# Patient Record
Sex: MALE | Race: WHITE | Employment: PART TIME | URBAN - METROPOLITAN AREA
[De-identification: names, ages, dates, MRNs, and addresses within clinical notes are randomized per-mention and may not be internally consistent; named-entity substitution may affect disease eponyms.]

---

## 2019-06-20 ENCOUNTER — OFFICE VISIT (OUTPATIENT)
Dept: FAMILY MEDICINE CLINIC | Age: 24
End: 2019-06-20
Payer: COMMERCIAL

## 2019-06-20 VITALS
RESPIRATION RATE: 14 BRPM | DIASTOLIC BLOOD PRESSURE: 80 MMHG | HEIGHT: 63 IN | HEART RATE: 112 BPM | SYSTOLIC BLOOD PRESSURE: 128 MMHG | OXYGEN SATURATION: 95 % | BODY MASS INDEX: 39.16 KG/M2 | WEIGHT: 221 LBS

## 2019-06-20 DIAGNOSIS — R60.0 LOWER EXTREMITY EDEMA: ICD-10-CM

## 2019-06-20 DIAGNOSIS — F42.2 MIXED OBSESSIONAL THOUGHTS AND ACTS: ICD-10-CM

## 2019-06-20 DIAGNOSIS — F41.9 ANXIETY: Primary | ICD-10-CM

## 2019-06-20 DIAGNOSIS — Z00.00 ANNUAL PHYSICAL EXAM: Primary | ICD-10-CM

## 2019-06-20 PROCEDURE — 1036F TOBACCO NON-USER: CPT | Performed by: FAMILY MEDICINE

## 2019-06-20 PROCEDURE — 99203 OFFICE O/P NEW LOW 30 MIN: CPT | Performed by: FAMILY MEDICINE

## 2019-06-20 PROCEDURE — G8417 CALC BMI ABV UP PARAM F/U: HCPCS | Performed by: FAMILY MEDICINE

## 2019-06-20 PROCEDURE — G8427 DOCREV CUR MEDS BY ELIG CLIN: HCPCS | Performed by: FAMILY MEDICINE

## 2019-06-20 RX ORDER — PANTOPRAZOLE SODIUM 40 MG/1
40 TABLET, DELAYED RELEASE ORAL DAILY
Qty: 30 TABLET | Refills: 5 | Status: SHIPPED | OUTPATIENT
Start: 2019-06-20 | End: 2019-07-12 | Stop reason: SDUPTHER

## 2019-06-20 RX ORDER — AMLODIPINE BESYLATE 10 MG/1
10 TABLET ORAL DAILY
Qty: 30 TABLET | Refills: 4 | Status: SHIPPED | OUTPATIENT
Start: 2019-06-20 | End: 2019-10-18 | Stop reason: SDUPTHER

## 2019-06-20 RX ORDER — LORATADINE 10 MG/1
10 CAPSULE, LIQUID FILLED ORAL DAILY
COMMUNITY

## 2019-06-20 RX ORDER — AMLODIPINE BESYLATE 10 MG/1
TABLET ORAL
Refills: 4 | COMMUNITY
Start: 2019-05-20 | End: 2019-06-20 | Stop reason: SDUPTHER

## 2019-06-20 RX ORDER — RISPERIDONE 1 MG/1
1 TABLET, FILM COATED ORAL NIGHTLY
Refills: 5 | COMMUNITY
Start: 2019-06-17

## 2019-06-20 RX ORDER — FLUVOXAMINE MALEATE 50 MG/1
50 TABLET, COATED ORAL NIGHTLY
Refills: 2 | COMMUNITY
Start: 2019-06-05

## 2019-06-20 RX ORDER — PANTOPRAZOLE SODIUM 40 MG/1
TABLET, DELAYED RELEASE ORAL
Refills: 5 | COMMUNITY
Start: 2019-05-14 | End: 2019-06-20 | Stop reason: SDUPTHER

## 2019-06-20 RX ORDER — SERTRALINE HYDROCHLORIDE 100 MG/1
TABLET, FILM COATED ORAL
Qty: 30 TABLET | Refills: 5 | Status: SHIPPED | OUTPATIENT
Start: 2019-06-20 | End: 2019-12-23 | Stop reason: ALTCHOICE

## 2019-06-20 RX ORDER — SERTRALINE HYDROCHLORIDE 100 MG/1
TABLET, FILM COATED ORAL
Refills: 5 | COMMUNITY
Start: 2019-05-14 | End: 2019-06-20 | Stop reason: SDUPTHER

## 2019-06-20 SDOH — HEALTH STABILITY: MENTAL HEALTH: HOW OFTEN DO YOU HAVE A DRINK CONTAINING ALCOHOL?: NEVER

## 2019-06-20 ASSESSMENT — PATIENT HEALTH QUESTIONNAIRE - PHQ9
SUM OF ALL RESPONSES TO PHQ9 QUESTIONS 1 & 2: 0
1. LITTLE INTEREST OR PLEASURE IN DOING THINGS: 0
2. FEELING DOWN, DEPRESSED OR HOPELESS: 0
SUM OF ALL RESPONSES TO PHQ QUESTIONS 1-9: 0
SUM OF ALL RESPONSES TO PHQ QUESTIONS 1-9: 0

## 2019-06-20 NOTE — PROGRESS NOTES
6/20/2019    This is a 20 y.o. male   Chief Complaint   Patient presents with   Laura Guo New Doctor     edema in lower legs since started Risperdone has gained 40 lbs since November   . HPI  Pt presents today as a new pt to establish a PCP. Sees psychiatrist at pt's school Charlette Braden MDD), pt's mother states that pt is autistic and had a psychotic episode in November 2018. Was started on Risperidone and has gained 40 pounds since then. Pt is now hungry all the time, attends [employer] with his sister and tries to be active. Has a psychiatry follow-up in 4 weeks. Has started drinking more water instead of Pepsi. There is a strong FamHx of CAD, HTN, diabetes. Has also been dx with OCD. Currently takes 50 mg of Zoloft (mom cuts 100 mg tablets in half), currently taking 1 1/2 tablets of Risperidone and Luvox 1 tablet at night.      Past Medical History:   Diagnosis Date    Allergic rhinitis     Anxiety     Asthma     Bipolar disorder (HCC)     Chronic back pain     Depression     GERD (gastroesophageal reflux disease)     Hypertension     Seizures (HCC)     Urinary incontinence        Past Surgical History:   Procedure Laterality Date    APPENDECTOMY      BACK SURGERY         Social History     Socioeconomic History    Marital status: Single     Spouse name: Not on file    Number of children: Not on file    Years of education: Not on file    Highest education level: Not on file   Occupational History    Not on file   Social Needs    Financial resource strain: Not on file    Food insecurity:     Worry: Not on file     Inability: Not on file    Transportation needs:     Medical: Not on file     Non-medical: Not on file   Tobacco Use    Smoking status: Never Smoker    Smokeless tobacco: Former User   Substance and Sexual Activity    Alcohol use: Never     Frequency: Never    Drug use: Never    Sexual activity: Not Currently   Lifestyle    Physical activity:     Days per week: Not on file Minutes per session: Not on file    Stress: Not on file   Relationships    Social connections:     Talks on phone: Not on file     Gets together: Not on file     Attends Scientology service: Not on file     Active member of club or organization: Not on file     Attends meetings of clubs or organizations: Not on file     Relationship status: Not on file    Intimate partner violence:     Fear of current or ex partner: Not on file     Emotionally abused: Not on file     Physically abused: Not on file     Forced sexual activity: Not on file   Other Topics Concern    Not on file   Social History Narrative    Not on file       Family History   Problem Relation Age of Onset    Cancer Mother     Diabetes Mother     High Blood Pressure Mother     Heart Disease Mother        Current Outpatient Medications   Medication Sig Dispense Refill    fluvoxaMINE (LUVOX) 25 MG tablet TK 1 T PO  HS  2    amLODIPine (NORVASC) 10 MG tablet TK 1 T PO Q NIGHT  4    pantoprazole (PROTONIX) 40 MG tablet TK 1 T PO D  5    risperiDONE (RISPERDAL) 1 MG tablet TK 1 T PO  QAM  5    sertraline (ZOLOFT) 100 MG tablet TK 1 T PO D FOR MAJOR DEPRESSIVE DISORDER  5    loratadine (CLARITIN) 10 MG capsule Take 10 mg by mouth daily       No current facility-administered medications for this visit. There is no immunization history on file for this patient. No Known Allergies    No results found for any previous visit. Review of Systems   Constitutional: Positive for appetite change (increased since November 2018). Cardiovascular: Positive for leg swelling. Psychiatric/Behavioral: Positive for agitation (improved) and dysphoric mood (improved). The patient is nervous/anxious (improved). /80 (Site: Right Upper Arm)   Pulse 112   Resp 14   Ht 5' 3\" (1.6 m)   Wt 221 lb (100.2 kg)   SpO2 95%   BMI 39.15 kg/m²     Physical Exam   Constitutional: He is oriented to person, place, and time.  He appears

## 2019-06-28 DIAGNOSIS — Z00.00 ANNUAL PHYSICAL EXAM: ICD-10-CM

## 2019-06-28 LAB
A/G RATIO: 1.8 (ref 1.1–2.2)
ALBUMIN SERPL-MCNC: 4.6 G/DL (ref 3.4–5)
ALP BLD-CCNC: 108 U/L (ref 40–129)
ALT SERPL-CCNC: 19 U/L (ref 10–40)
ANION GAP SERPL CALCULATED.3IONS-SCNC: 12 MMOL/L (ref 3–16)
AST SERPL-CCNC: 14 U/L (ref 15–37)
BASOPHILS ABSOLUTE: 0 K/UL (ref 0–0.2)
BASOPHILS RELATIVE PERCENT: 0.9 %
BILIRUB SERPL-MCNC: 0.4 MG/DL (ref 0–1)
BUN BLDV-MCNC: 9 MG/DL (ref 7–20)
CALCIUM SERPL-MCNC: 9.4 MG/DL (ref 8.3–10.6)
CHLORIDE BLD-SCNC: 106 MMOL/L (ref 99–110)
CHOLESTEROL, TOTAL: 183 MG/DL (ref 0–199)
CO2: 25 MMOL/L (ref 21–32)
CREAT SERPL-MCNC: 0.6 MG/DL (ref 0.9–1.3)
EOSINOPHILS ABSOLUTE: 0.2 K/UL (ref 0–0.6)
EOSINOPHILS RELATIVE PERCENT: 4.8 %
GFR AFRICAN AMERICAN: >60
GFR NON-AFRICAN AMERICAN: >60
GLOBULIN: 2.5 G/DL
GLUCOSE BLD-MCNC: 93 MG/DL (ref 70–99)
HCT VFR BLD CALC: 44.2 % (ref 40.5–52.5)
HDLC SERPL-MCNC: 47 MG/DL (ref 40–60)
HEMOGLOBIN: 14.7 G/DL (ref 13.5–17.5)
LDL CHOLESTEROL CALCULATED: 120 MG/DL
LYMPHOCYTES ABSOLUTE: 1.2 K/UL (ref 1–5.1)
LYMPHOCYTES RELATIVE PERCENT: 24.5 %
MCH RBC QN AUTO: 28 PG (ref 26–34)
MCHC RBC AUTO-ENTMCNC: 33.2 G/DL (ref 31–36)
MCV RBC AUTO: 84.4 FL (ref 80–100)
MONOCYTES ABSOLUTE: 0.4 K/UL (ref 0–1.3)
MONOCYTES RELATIVE PERCENT: 8.2 %
NEUTROPHILS ABSOLUTE: 3 K/UL (ref 1.7–7.7)
NEUTROPHILS RELATIVE PERCENT: 61.6 %
PDW BLD-RTO: 14.6 % (ref 12.4–15.4)
PLATELET # BLD: 209 K/UL (ref 135–450)
PMV BLD AUTO: 8.9 FL (ref 5–10.5)
POTASSIUM SERPL-SCNC: 4.3 MMOL/L (ref 3.5–5.1)
RBC # BLD: 5.23 M/UL (ref 4.2–5.9)
SODIUM BLD-SCNC: 143 MMOL/L (ref 136–145)
T3 FREE: 3 PG/ML (ref 2.3–4.2)
T4 FREE: 1.2 NG/DL (ref 0.9–1.8)
TOTAL PROTEIN: 7.1 G/DL (ref 6.4–8.2)
TRIGL SERPL-MCNC: 82 MG/DL (ref 0–150)
TSH SERPL DL<=0.05 MIU/L-ACNC: 1 UIU/ML (ref 0.27–4.2)
VLDLC SERPL CALC-MCNC: 16 MG/DL
WBC # BLD: 4.9 K/UL (ref 4–11)

## 2019-07-15 RX ORDER — PANTOPRAZOLE SODIUM 40 MG/1
40 TABLET, DELAYED RELEASE ORAL DAILY
Qty: 30 TABLET | Refills: 0 | Status: SHIPPED | OUTPATIENT
Start: 2019-07-15 | End: 2019-12-17 | Stop reason: SDUPTHER

## 2019-07-23 ENCOUNTER — OFFICE VISIT (OUTPATIENT)
Dept: FAMILY MEDICINE CLINIC | Age: 24
End: 2019-07-23
Payer: COMMERCIAL

## 2019-07-23 VITALS
TEMPERATURE: 97.3 F | HEART RATE: 80 BPM | HEIGHT: 64 IN | BODY MASS INDEX: 38.58 KG/M2 | WEIGHT: 226 LBS | OXYGEN SATURATION: 93 % | RESPIRATION RATE: 16 BRPM | SYSTOLIC BLOOD PRESSURE: 139 MMHG | DIASTOLIC BLOOD PRESSURE: 79 MMHG

## 2019-07-23 DIAGNOSIS — M79.672 BILATERAL FOOT PAIN: ICD-10-CM

## 2019-07-23 DIAGNOSIS — Z00.00 ANNUAL PHYSICAL EXAM: Primary | ICD-10-CM

## 2019-07-23 DIAGNOSIS — M79.671 BILATERAL FOOT PAIN: ICD-10-CM

## 2019-07-23 PROCEDURE — 99395 PREV VISIT EST AGE 18-39: CPT | Performed by: FAMILY MEDICINE

## 2019-07-23 ASSESSMENT — PATIENT HEALTH QUESTIONNAIRE - PHQ9
SUM OF ALL RESPONSES TO PHQ9 QUESTIONS 1 & 2: 2
1. LITTLE INTEREST OR PLEASURE IN DOING THINGS: 1
SUM OF ALL RESPONSES TO PHQ QUESTIONS 1-9: 2
SUM OF ALL RESPONSES TO PHQ QUESTIONS 1-9: 2
2. FEELING DOWN, DEPRESSED OR HOPELESS: 1

## 2019-07-23 ASSESSMENT — ENCOUNTER SYMPTOMS
SHORTNESS OF BREATH: 1
EYE ITCHING: 0
ABDOMINAL PAIN: 0
BACK PAIN: 0
EYE PAIN: 0

## 2019-07-23 NOTE — PROGRESS NOTES
Problem Relation Age of Onset    Cancer Mother     Diabetes Mother     High Blood Pressure Mother     Heart Disease Mother      Social History     Socioeconomic History    Marital status: Single     Spouse name: Not on file    Number of children: Not on file    Years of education: Not on file    Highest education level: Not on file   Occupational History    Not on file   Social Needs    Financial resource strain: Not on file    Food insecurity:     Worry: Not on file     Inability: Not on file    Transportation needs:     Medical: Not on file     Non-medical: Not on file   Tobacco Use    Smoking status: Never Smoker    Smokeless tobacco: Former User   Substance and Sexual Activity    Alcohol use: Never     Frequency: Never    Drug use: Never    Sexual activity: Not Currently   Lifestyle    Physical activity:     Days per week: Not on file     Minutes per session: Not on file    Stress: Not on file   Relationships    Social connections:     Talks on phone: Not on file     Gets together: Not on file     Attends Gnosticism service: Not on file     Active member of club or organization: Not on file     Attends meetings of clubs or organizations: Not on file     Relationship status: Not on file    Intimate partner violence:     Fear of current or ex partner: Not on file     Emotionally abused: Not on file     Physically abused: Not on file     Forced sexual activity: Not on file   Other Topics Concern    Not on file   Social History Narrative    Not on file       Review ofSystems:  Review of Systems   Constitutional: Positive for fatigue. HENT: Negative for congestion. Eyes: Negative for pain, itching and visual disturbance. Respiratory: Positive for shortness of breath (with exertion). Cardiovascular: Negative for chest pain and palpitations. Gastrointestinal: Negative for abdominal pain. Endocrine: Negative for cold intolerance and heat intolerance.    Genitourinary: Negative for difficulty urinating and hematuria. Musculoskeletal: Positive for myalgias (feet). Negative for back pain and neck pain. Skin: Negative for rash. Allergic/Immunologic: Positive for environmental allergies. Neurological: Negative for dizziness and numbness. Hematological: Does not bruise/bleed easily. Psychiatric/Behavioral: Negative for dysphoric mood. The patient is not nervous/anxious. PhysicalExam:   Vitals:    07/23/19 0946   BP: 139/79   Site: Left Upper Arm   Position: Sitting   Cuff Size: Medium Adult   Pulse: 80   Resp: 16   Temp: 97.3 °F (36.3 °C)   TempSrc: Oral   SpO2: 93%   Weight: 226 lb (102.5 kg)   Height: 5' 3.75\" (1.619 m)     Body mass index is 39.1 kg/m². Physical Exam   Constitutional: He is oriented to person, place, and time. He appears well-developed and well-nourished. HENT:   Head: Normocephalic and atraumatic. Right Ear: External ear normal.   Left Ear: External ear normal.   Nose: Nose normal.   Mouth/Throat: Oropharynx is clear and moist.   Eyes: Pupils are equal, round, and reactive to light. EOM are normal.   Neck: Normal range of motion. Cardiovascular: Normal rate, regular rhythm, normal heart sounds and intact distal pulses. Mild/improved bilateral ankle edema   Pulmonary/Chest: Effort normal and breath sounds normal.   Abdominal: Soft. Bowel sounds are normal.   Musculoskeletal: Normal range of motion. Neurological: He is alert and oriented to person, place, and time. He has normal reflexes. Skin: Skin is warm and dry. Psychiatric: He has a normal mood and affect. His behavior is normal. Judgment and thought content normal.   Vitals reviewed. Assessment/Plan:   Diagnosis Orders   1. Annual physical exam     2. Bilateral foot pain  Yanelis Kim DPM, Podiatry, [facility]     Encouraged gradual/frequency exercise. Return in about 3 months (around 10/23/2019) for Podiatry/Foot Pain F/U.     Prior to Visit Medications    Medication Sig

## 2019-09-11 ENCOUNTER — OFFICE VISIT (OUTPATIENT)
Dept: FAMILY MEDICINE CLINIC | Age: 24
End: 2019-09-11
Payer: COMMERCIAL

## 2019-09-11 VITALS
SYSTOLIC BLOOD PRESSURE: 122 MMHG | DIASTOLIC BLOOD PRESSURE: 82 MMHG | BODY MASS INDEX: 39.69 KG/M2 | OXYGEN SATURATION: 91 % | HEIGHT: 63 IN | RESPIRATION RATE: 14 BRPM | WEIGHT: 224 LBS | HEART RATE: 71 BPM

## 2019-09-11 DIAGNOSIS — R30.0 DYSURIA: ICD-10-CM

## 2019-09-11 DIAGNOSIS — R35.0 FREQUENT URINATION: Primary | ICD-10-CM

## 2019-09-11 DIAGNOSIS — R34 DECREASED URINE OUTPUT: ICD-10-CM

## 2019-09-11 DIAGNOSIS — R39.9 UTI SYMPTOMS: ICD-10-CM

## 2019-09-11 LAB
BILIRUBIN, POC: NORMAL
BLOOD URINE, POC: NORMAL
CLARITY, POC: NORMAL
COLOR, POC: NORMAL
GLUCOSE URINE, POC: NORMAL
KETONES, POC: NORMAL
LEUKOCYTE EST, POC: NORMAL
NITRITE, POC: NORMAL
PH, POC: 7
PROTEIN, POC: NORMAL
SPECIFIC GRAVITY, POC: 1.02
UROBILINOGEN, POC: NORMAL

## 2019-09-11 PROCEDURE — G8417 CALC BMI ABV UP PARAM F/U: HCPCS | Performed by: FAMILY MEDICINE

## 2019-09-11 PROCEDURE — G8427 DOCREV CUR MEDS BY ELIG CLIN: HCPCS | Performed by: FAMILY MEDICINE

## 2019-09-11 PROCEDURE — 81002 URINALYSIS NONAUTO W/O SCOPE: CPT | Performed by: FAMILY MEDICINE

## 2019-09-11 PROCEDURE — 99213 OFFICE O/P EST LOW 20 MIN: CPT | Performed by: FAMILY MEDICINE

## 2019-09-11 PROCEDURE — 1036F TOBACCO NON-USER: CPT | Performed by: FAMILY MEDICINE

## 2019-09-11 RX ORDER — NITROFURANTOIN 25; 75 MG/1; MG/1
100 CAPSULE ORAL 2 TIMES DAILY
Qty: 14 CAPSULE | Refills: 0 | Status: SHIPPED | OUTPATIENT
Start: 2019-09-11 | End: 2019-09-18

## 2019-09-11 ASSESSMENT — PATIENT HEALTH QUESTIONNAIRE - PHQ9
SUM OF ALL RESPONSES TO PHQ QUESTIONS 1-9: 0
1. LITTLE INTEREST OR PLEASURE IN DOING THINGS: 0
2. FEELING DOWN, DEPRESSED OR HOPELESS: 0
SUM OF ALL RESPONSES TO PHQ9 QUESTIONS 1 & 2: 0
SUM OF ALL RESPONSES TO PHQ QUESTIONS 1-9: 0

## 2019-09-11 ASSESSMENT — ENCOUNTER SYMPTOMS: ABDOMINAL PAIN: 0

## 2019-09-11 NOTE — PROGRESS NOTES
9/11/2019    This is a 30 y.o. male   Chief Complaint   Patient presents with    Urinary Tract Infection     Frequent Urination started 2-3 weeks ago burning and pressure   . HPI  Patient presents today for frequent urinations and started to 3 weeks ago. Also having dysuria and pressure. Mother present with pt. Pt denies abdominal pain, hematuria, or f/c. Mom states that pt has no sx when he is busy. Denies any changes in fluids or foods, mom states that pt's diet has improved, no longer drinking a lot soda.   Past Medical History:   Diagnosis Date    Allergic rhinitis     Anxiety     Asthma     Bipolar disorder (HCC)     Chronic back pain     Depression     GERD (gastroesophageal reflux disease)     Hypertension     Seizures (HCC)     Urinary incontinence        Past Surgical History:   Procedure Laterality Date    APPENDECTOMY      BACK SURGERY         Social History     Socioeconomic History    Marital status: Single     Spouse name: Not on file    Number of children: Not on file    Years of education: Not on file    Highest education level: Not on file   Occupational History    Not on file   Social Needs    Financial resource strain: Not on file    Food insecurity:     Worry: Not on file     Inability: Not on file    Transportation needs:     Medical: Not on file     Non-medical: Not on file   Tobacco Use    Smoking status: Never Smoker    Smokeless tobacco: Former User   Substance and Sexual Activity    Alcohol use: Never     Frequency: Never    Drug use: Never    Sexual activity: Not Currently   Lifestyle    Physical activity:     Days per week: Not on file     Minutes per session: Not on file    Stress: Not on file   Relationships    Social connections:     Talks on phone: Not on file     Gets together: Not on file     Attends Moravian service: Not on file     Active member of club or organization: Not on file     Attends meetings of clubs or organizations: Not on file Relationship status: Not on file    Intimate partner violence:     Fear of current or ex partner: Not on file     Emotionally abused: Not on file     Physically abused: Not on file     Forced sexual activity: Not on file   Other Topics Concern    Not on file   Social History Narrative    Not on file       Family History   Problem Relation Age of Onset    Cancer Mother     Diabetes Mother     High Blood Pressure Mother     Heart Disease Mother        Current Outpatient Medications   Medication Sig Dispense Refill    nitrofurantoin, macrocrystal-monohydrate, (MACROBID) 100 MG capsule Take 1 capsule by mouth 2 times daily for 7 days 14 capsule 0    pantoprazole (PROTONIX) 40 MG tablet TAKE 1 TABLET BY MOUTH DAILY 30 tablet 0    fluvoxaMINE (LUVOX) 50 MG tablet Take 50 mg by mouth nightly   2    risperiDONE (RISPERDAL) 1 MG tablet TK 1 T PO  QAM  5    loratadine (CLARITIN) 10 MG capsule Take 10 mg by mouth daily      amLODIPine (NORVASC) 10 MG tablet Take 1 tablet by mouth daily 30 tablet 4    sertraline (ZOLOFT) 100 MG tablet Take 1 tablet by mouth daily (Patient taking differently: Take 50 mg by mouth daily Take 1 tablet by mouth daily) 30 tablet 5     No current facility-administered medications for this visit. There is no immunization history on file for this patient.     No Known Allergies    Orders Only on 06/28/2019   Component Date Value Ref Range Status    Sodium 06/28/2019 143  136 - 145 mmol/L Final    Potassium 06/28/2019 4.3  3.5 - 5.1 mmol/L Final    Chloride 06/28/2019 106  99 - 110 mmol/L Final    CO2 06/28/2019 25  21 - 32 mmol/L Final    Anion Gap 06/28/2019 12  3 - 16 Final    Glucose 06/28/2019 93  70 - 99 mg/dL Final    BUN 06/28/2019 9  7 - 20 mg/dL Final    CREATININE 06/28/2019 0.6* 0.9 - 1.3 mg/dL Final    GFR Non- 06/28/2019 >60  >60 Final    Comment: >60 mL/min/1.73m2 EGFR, calc. for ages 25 and older using the  MDRD formula (not corrected for

## 2019-09-19 ENCOUNTER — OFFICE VISIT (OUTPATIENT)
Dept: FAMILY MEDICINE CLINIC | Age: 24
End: 2019-09-19
Payer: COMMERCIAL

## 2019-09-19 VITALS
BODY MASS INDEX: 38.98 KG/M2 | SYSTOLIC BLOOD PRESSURE: 114 MMHG | HEART RATE: 76 BPM | HEIGHT: 63 IN | RESPIRATION RATE: 14 BRPM | DIASTOLIC BLOOD PRESSURE: 74 MMHG | TEMPERATURE: 97.3 F | OXYGEN SATURATION: 96 % | WEIGHT: 220 LBS

## 2019-09-19 DIAGNOSIS — R35.0 FREQUENT URINATION: Primary | ICD-10-CM

## 2019-09-19 DIAGNOSIS — R31.9 HEMATURIA, UNSPECIFIED TYPE: ICD-10-CM

## 2019-09-19 LAB
BILIRUBIN, POC: NORMAL
BLOOD URINE, POC: NORMAL
CLARITY, POC: NORMAL
COLOR, POC: NORMAL
GLUCOSE URINE, POC: NORMAL
KETONES, POC: NORMAL
LEUKOCYTE EST, POC: NORMAL
NITRITE, POC: NORMAL
PH, POC: 6
PROTEIN, POC: NORMAL
SPECIFIC GRAVITY, POC: >1.03
UROBILINOGEN, POC: NORMAL

## 2019-09-19 PROCEDURE — 81002 URINALYSIS NONAUTO W/O SCOPE: CPT | Performed by: FAMILY MEDICINE

## 2019-09-19 PROCEDURE — 1036F TOBACCO NON-USER: CPT | Performed by: FAMILY MEDICINE

## 2019-09-19 PROCEDURE — G8417 CALC BMI ABV UP PARAM F/U: HCPCS | Performed by: FAMILY MEDICINE

## 2019-09-19 PROCEDURE — 99213 OFFICE O/P EST LOW 20 MIN: CPT | Performed by: FAMILY MEDICINE

## 2019-09-19 PROCEDURE — G8427 DOCREV CUR MEDS BY ELIG CLIN: HCPCS | Performed by: FAMILY MEDICINE

## 2019-09-19 NOTE — PROGRESS NOTES
Social connections:     Talks on phone: Not on file     Gets together: Not on file     Attends Quaker service: Not on file     Active member of club or organization: Not on file     Attends meetings of clubs or organizations: Not on file     Relationship status: Not on file    Intimate partner violence:     Fear of current or ex partner: Not on file     Emotionally abused: Not on file     Physically abused: Not on file     Forced sexual activity: Not on file   Other Topics Concern    Not on file   Social History Narrative    Not on file       Family History   Problem Relation Age of Onset    Cancer Mother     Diabetes Mother     High Blood Pressure Mother     Heart Disease Mother        Current Outpatient Medications   Medication Sig Dispense Refill    pantoprazole (PROTONIX) 40 MG tablet TAKE 1 TABLET BY MOUTH DAILY 30 tablet 0    fluvoxaMINE (LUVOX) 50 MG tablet Take 50 mg by mouth nightly   2    risperiDONE (RISPERDAL) 1 MG tablet TK 1 T PO  QAM  5    loratadine (CLARITIN) 10 MG capsule Take 10 mg by mouth daily      amLODIPine (NORVASC) 10 MG tablet Take 1 tablet by mouth daily 30 tablet 4    sertraline (ZOLOFT) 100 MG tablet Take 1 tablet by mouth daily (Patient taking differently: Take 50 mg by mouth daily Take 1 tablet by mouth daily) 30 tablet 5     No current facility-administered medications for this visit. There is no immunization history on file for this patient.     No Known Allergies    Office Visit on 09/11/2019   Component Date Value Ref Range Status    Glucose, UA POC 09/11/2019 NEG   Final    Bilirubin, UA 09/11/2019 NEG   Final    Ketones, UA 09/11/2019 NEG   Final    Spec Grav, UA 09/11/2019 1.020   Final    Blood, UA POC 09/11/2019 NEG   Final    pH, UA 09/11/2019 7.0   Final    Protein, UA POC 09/11/2019 NEG   Final    Urobilinogen, UA 09/11/2019 1.0 EU   Final    Leukocytes, UA 09/11/2019 NEG   Final    Nitrite, UA 09/11/2019 NEG   Final       Review of Systems   Genitourinary: Positive for difficulty urinating (maybe). Negative for dysuria and frequency. Mild pelvic pressure       /74 (Site: Right Upper Arm)   Pulse 76   Temp 97.3 °F (36.3 °C) (Oral)   Resp 14   Ht 5' 3\" (1.6 m)   Wt 220 lb (99.8 kg)   SpO2 96%   BMI 38.97 kg/m²     Physical Exam   Constitutional: He is oriented to person, place, and time. He appears well-developed and well-nourished. HENT:   Head: Normocephalic and atraumatic. Eyes: Pupils are equal, round, and reactive to light. EOM are normal.   Neck: Normal range of motion. Cardiovascular: Normal rate, regular rhythm and normal heart sounds. Pulmonary/Chest: Effort normal and breath sounds normal.   Abdominal: Bowel sounds are normal. There is tenderness (lower). Neurological: He is alert and oriented to person, place, and time. Psychiatric: He has a normal mood and affect. His behavior is normal. Judgment and thought content normal.   Vitals reviewed. Plan   Diagnosis Orders   1. Frequent urination  POCT Urinalysis no Micro   2. Hematuria, unspecified type  POCT UA in 3 weeks       No follow-ups on file. Prior to Visit Medications    Medication Sig Taking?  Authorizing Provider   pantoprazole (PROTONIX) 40 MG tablet TAKE 1 TABLET BY MOUTH DAILY Yes Joann Johnson,    fluvoxaMINE (LUVOX) 50 MG tablet Take 50 mg by mouth nightly  Yes Historical Provider, MD   risperiDONE (RISPERDAL) 1 MG tablet TK 1 T PO  QAM Yes Historical Provider, MD   loratadine (CLARITIN) 10 MG capsule Take 10 mg by mouth daily Yes Historical Provider, MD   amLODIPine (NORVASC) 10 MG tablet Take 1 tablet by mouth daily Yes Joann Johnson DO   sertraline (ZOLOFT) 100 MG tablet Take 1 tablet by mouth daily  Patient taking differently: Take 50 mg by mouth daily Take 1 tablet by mouth daily Yes Joann Johnson DO

## 2019-10-10 ENCOUNTER — NURSE ONLY (OUTPATIENT)
Dept: FAMILY MEDICINE CLINIC | Age: 24
End: 2019-10-10
Payer: COMMERCIAL

## 2019-10-10 DIAGNOSIS — R31.9 HEMATURIA, UNSPECIFIED TYPE: Primary | ICD-10-CM

## 2019-10-10 LAB
BILIRUBIN, POC: NORMAL
BLOOD URINE, POC: NORMAL
CLARITY, POC: NORMAL
COLOR, POC: NORMAL
GLUCOSE URINE, POC: NORMAL
KETONES, POC: NORMAL
LEUKOCYTE EST, POC: NORMAL
NITRITE, POC: NORMAL
PH, POC: 8.5
PROTEIN, POC: NORMAL
SPECIFIC GRAVITY, POC: 1.01
UROBILINOGEN, POC: 1

## 2019-10-10 PROCEDURE — 81003 URINALYSIS AUTO W/O SCOPE: CPT | Performed by: FAMILY MEDICINE

## 2019-10-14 ENCOUNTER — NURSE ONLY (OUTPATIENT)
Dept: FAMILY MEDICINE CLINIC | Age: 24
End: 2019-10-14
Payer: COMMERCIAL

## 2019-10-14 DIAGNOSIS — Z23 NEEDS FLU SHOT: Primary | ICD-10-CM

## 2019-10-14 PROCEDURE — 90686 IIV4 VACC NO PRSV 0.5 ML IM: CPT | Performed by: FAMILY MEDICINE

## 2019-10-14 PROCEDURE — 90471 IMMUNIZATION ADMIN: CPT | Performed by: FAMILY MEDICINE

## 2019-10-18 RX ORDER — AMLODIPINE BESYLATE 10 MG/1
10 TABLET ORAL DAILY
Qty: 30 TABLET | Refills: 3 | Status: SHIPPED | OUTPATIENT
Start: 2019-10-18 | End: 2020-02-19

## 2019-10-30 ENCOUNTER — TELEPHONE (OUTPATIENT)
Dept: FAMILY MEDICINE CLINIC | Age: 24
End: 2019-10-30

## 2019-10-30 RX ORDER — FLUTICASONE PROPIONATE 50 MCG
1 SPRAY, SUSPENSION (ML) NASAL DAILY
Qty: 2 BOTTLE | Refills: 1 | Status: CANCELLED | OUTPATIENT
Start: 2019-10-30

## 2019-10-30 RX ORDER — FLUTICASONE PROPIONATE 50 MCG
1 SPRAY, SUSPENSION (ML) NASAL DAILY
Qty: 1 BOTTLE | Refills: 0 | Status: SHIPPED | OUTPATIENT
Start: 2019-10-30 | End: 2022-05-25 | Stop reason: SDUPTHER

## 2019-12-17 RX ORDER — PANTOPRAZOLE SODIUM 40 MG/1
40 TABLET, DELAYED RELEASE ORAL DAILY
Qty: 30 TABLET | Refills: 2 | Status: SHIPPED | OUTPATIENT
Start: 2019-12-17 | End: 2020-03-16

## 2019-12-23 ENCOUNTER — OFFICE VISIT (OUTPATIENT)
Dept: FAMILY MEDICINE CLINIC | Age: 24
End: 2019-12-23
Payer: COMMERCIAL

## 2019-12-23 VITALS
HEART RATE: 111 BPM | SYSTOLIC BLOOD PRESSURE: 138 MMHG | TEMPERATURE: 98.2 F | DIASTOLIC BLOOD PRESSURE: 86 MMHG | WEIGHT: 232 LBS | BODY MASS INDEX: 41.1 KG/M2 | RESPIRATION RATE: 16 BRPM | OXYGEN SATURATION: 96 %

## 2019-12-23 DIAGNOSIS — F84.0 AUTISM: ICD-10-CM

## 2019-12-23 DIAGNOSIS — Z87.19 HISTORY OF DENTAL PROBLEMS: ICD-10-CM

## 2019-12-23 DIAGNOSIS — H61.23 BILATERAL IMPACTED CERUMEN: Primary | ICD-10-CM

## 2019-12-23 PROCEDURE — 1036F TOBACCO NON-USER: CPT | Performed by: NURSE PRACTITIONER

## 2019-12-23 PROCEDURE — 99214 OFFICE O/P EST MOD 30 MIN: CPT | Performed by: NURSE PRACTITIONER

## 2019-12-23 PROCEDURE — G8417 CALC BMI ABV UP PARAM F/U: HCPCS | Performed by: NURSE PRACTITIONER

## 2019-12-23 PROCEDURE — G8427 DOCREV CUR MEDS BY ELIG CLIN: HCPCS | Performed by: NURSE PRACTITIONER

## 2019-12-23 PROCEDURE — G8482 FLU IMMUNIZE ORDER/ADMIN: HCPCS | Performed by: NURSE PRACTITIONER

## 2019-12-23 RX ORDER — CHLORHEXIDINE GLUCONATE 0.12 MG/ML
RINSE ORAL
Refills: 0 | COMMUNITY
Start: 2019-12-05 | End: 2021-11-17 | Stop reason: ALTCHOICE

## 2019-12-23 RX ORDER — PREDNISONE 20 MG/1
TABLET ORAL
COMMUNITY
Start: 2019-12-20 | End: 2021-11-17 | Stop reason: ALTCHOICE

## 2019-12-23 ASSESSMENT — PATIENT HEALTH QUESTIONNAIRE - PHQ9
2. FEELING DOWN, DEPRESSED OR HOPELESS: 0
1. LITTLE INTEREST OR PLEASURE IN DOING THINGS: 0
SUM OF ALL RESPONSES TO PHQ9 QUESTIONS 1 & 2: 0
SUM OF ALL RESPONSES TO PHQ QUESTIONS 1-9: 0
SUM OF ALL RESPONSES TO PHQ QUESTIONS 1-9: 0

## 2019-12-23 ASSESSMENT — ENCOUNTER SYMPTOMS: RESPIRATORY NEGATIVE: 1

## 2020-02-19 RX ORDER — AMLODIPINE BESYLATE 10 MG/1
10 TABLET ORAL DAILY
Qty: 30 TABLET | Refills: 2 | Status: SHIPPED | OUTPATIENT
Start: 2020-02-19 | End: 2020-05-18

## 2020-05-18 RX ORDER — AMLODIPINE BESYLATE 10 MG/1
TABLET ORAL
Qty: 30 TABLET | Refills: 1 | Status: SHIPPED | OUTPATIENT
Start: 2020-05-18 | End: 2020-06-16

## 2020-05-21 RX ORDER — PANTOPRAZOLE SODIUM 40 MG/1
TABLET, DELAYED RELEASE ORAL
Qty: 90 TABLET | Refills: 0 | Status: SHIPPED | OUTPATIENT
Start: 2020-05-21 | End: 2020-10-01

## 2020-06-16 RX ORDER — AMLODIPINE BESYLATE 10 MG/1
TABLET ORAL
Qty: 30 TABLET | Refills: 1 | Status: SHIPPED | OUTPATIENT
Start: 2020-06-16 | End: 2020-07-23 | Stop reason: SDUPTHER

## 2020-07-23 RX ORDER — AMLODIPINE BESYLATE 10 MG/1
TABLET ORAL
Qty: 90 TABLET | Refills: 1 | Status: SHIPPED | OUTPATIENT
Start: 2020-07-23 | End: 2021-03-15

## 2020-10-01 RX ORDER — PANTOPRAZOLE SODIUM 40 MG/1
TABLET, DELAYED RELEASE ORAL
Qty: 90 TABLET | Refills: 0 | Status: SHIPPED | OUTPATIENT
Start: 2020-10-01 | End: 2021-01-05

## 2020-10-01 NOTE — TELEPHONE ENCOUNTER
Please let pt know that I refilled their medication, and that I'd like for him to make an appointment for a Physical and have his fasting labs done beforehand. Thank you.

## 2021-01-05 RX ORDER — PANTOPRAZOLE SODIUM 40 MG/1
TABLET, DELAYED RELEASE ORAL
Qty: 90 TABLET | Refills: 0 | Status: SHIPPED | OUTPATIENT
Start: 2021-01-05 | Stop reason: SDUPTHER

## 2021-04-26 RX ORDER — PANTOPRAZOLE SODIUM 40 MG/1
TABLET, DELAYED RELEASE ORAL
Qty: 90 TABLET | Refills: 0 | Status: SHIPPED | OUTPATIENT
Start: 2021-04-26 | End: 2021-07-26

## 2021-07-26 RX ORDER — PANTOPRAZOLE SODIUM 40 MG/1
TABLET, DELAYED RELEASE ORAL
Qty: 90 TABLET | Refills: 0 | Status: SHIPPED | OUTPATIENT
Start: 2021-07-26 | End: 2021-10-25

## 2021-07-26 NOTE — TELEPHONE ENCOUNTER
Please let pt know that I refilled their medication, and that I'd like for his caregiver to schedule a Physical for pt and for him to have fasting labs done beforehand. Thank you.

## 2021-09-08 RX ORDER — AMLODIPINE BESYLATE 10 MG/1
TABLET ORAL
Qty: 90 TABLET | Refills: 1 | Status: SHIPPED | OUTPATIENT
Start: 2021-09-08 | End: 2021-12-22

## 2021-10-25 RX ORDER — PANTOPRAZOLE SODIUM 40 MG/1
TABLET, DELAYED RELEASE ORAL
Qty: 90 TABLET | Refills: 0 | Status: SHIPPED | OUTPATIENT
Start: 2021-10-25 | End: 2021-12-22

## 2021-11-17 ENCOUNTER — VIRTUAL VISIT (OUTPATIENT)
Dept: FAMILY MEDICINE CLINIC | Age: 26
End: 2021-11-17
Payer: MEDICARE

## 2021-11-17 ENCOUNTER — NURSE TRIAGE (OUTPATIENT)
Dept: OTHER | Facility: CLINIC | Age: 26
End: 2021-11-17

## 2021-11-17 DIAGNOSIS — T50.901A ACCIDENTAL MEDICATION ERROR, INITIAL ENCOUNTER: Primary | ICD-10-CM

## 2021-11-17 PROCEDURE — G8427 DOCREV CUR MEDS BY ELIG CLIN: HCPCS | Performed by: FAMILY MEDICINE

## 2021-11-17 PROCEDURE — 99213 OFFICE O/P EST LOW 20 MIN: CPT | Performed by: FAMILY MEDICINE

## 2021-11-17 RX ORDER — M-VIT,TX,IRON,MINS/CALC/FOLIC 27MG-0.4MG
1 TABLET ORAL DAILY
COMMUNITY

## 2021-11-17 RX ORDER — ACETAMINOPHEN 160 MG
TABLET,DISINTEGRATING ORAL
COMMUNITY

## 2021-11-17 RX ORDER — MULTIVIT WITH MINERALS/LUTEIN
250 TABLET ORAL DAILY
COMMUNITY

## 2021-11-17 SDOH — ECONOMIC STABILITY: INCOME INSECURITY: IN THE LAST 12 MONTHS, WAS THERE A TIME WHEN YOU WERE NOT ABLE TO PAY THE MORTGAGE OR RENT ON TIME?: NO

## 2021-11-17 SDOH — ECONOMIC STABILITY: HOUSING INSECURITY
IN THE LAST 12 MONTHS, WAS THERE A TIME WHEN YOU DID NOT HAVE A STEADY PLACE TO SLEEP OR SLEPT IN A SHELTER (INCLUDING NOW)?: NO

## 2021-11-17 SDOH — ECONOMIC STABILITY: TRANSPORTATION INSECURITY
IN THE PAST 12 MONTHS, HAS THE LACK OF TRANSPORTATION KEPT YOU FROM MEDICAL APPOINTMENTS OR FROM GETTING MEDICATIONS?: NO

## 2021-11-17 SDOH — ECONOMIC STABILITY: FOOD INSECURITY: WITHIN THE PAST 12 MONTHS, YOU WORRIED THAT YOUR FOOD WOULD RUN OUT BEFORE YOU GOT MONEY TO BUY MORE.: NEVER TRUE

## 2021-11-17 SDOH — ECONOMIC STABILITY: FOOD INSECURITY: WITHIN THE PAST 12 MONTHS, THE FOOD YOU BOUGHT JUST DIDN'T LAST AND YOU DIDN'T HAVE MONEY TO GET MORE.: NEVER TRUE

## 2021-11-17 SDOH — ECONOMIC STABILITY: TRANSPORTATION INSECURITY
IN THE PAST 12 MONTHS, HAS LACK OF TRANSPORTATION KEPT YOU FROM MEETINGS, WORK, OR FROM GETTING THINGS NEEDED FOR DAILY LIVING?: NO

## 2021-11-17 ASSESSMENT — PATIENT HEALTH QUESTIONNAIRE - PHQ9
1. LITTLE INTEREST OR PLEASURE IN DOING THINGS: 0
SUM OF ALL RESPONSES TO PHQ9 QUESTIONS 1 & 2: 2
SUM OF ALL RESPONSES TO PHQ QUESTIONS 1-9: 2
SUM OF ALL RESPONSES TO PHQ QUESTIONS 1-9: 2
2. FEELING DOWN, DEPRESSED OR HOPELESS: 2
SUM OF ALL RESPONSES TO PHQ QUESTIONS 1-9: 2

## 2021-11-17 ASSESSMENT — SOCIAL DETERMINANTS OF HEALTH (SDOH): HOW HARD IS IT FOR YOU TO PAY FOR THE VERY BASICS LIKE FOOD, HOUSING, MEDICAL CARE, AND HEATING?: NOT HARD AT ALL

## 2021-11-17 NOTE — TELEPHONE ENCOUNTER
Received call from [facility]  at [facility] with Red Flag Complaint. Brief description of triage: 28 y/o   Autistic   Male  Took his night does of medication at  8am  Today caller denies any symptoms at this time   Pt took lisinopril , Risperdal and fluoxetine    Triage indicates for patient to call pcp  Now  Warm transfer to [facility] @ [address],      [facility] Arturo Phan  The number for poison control [phone]    Care advice provided, patient verbalizes understanding; denies any other questions or concerns; instructed to call back for any new or worsening symptoms. Attention Provider: Thank you for allowing me to participate in the care of your patient. The patient was connected to triage in response to information provided to the ECC/PSC. Please do not respond through this encounter as the response is not directed to a shared pool. Reason for Disposition   [1] DOUBLE DOSE (an extra dose or lesser amount) of prescription drug AND [2] NO symptoms (Exception: a double dose of antibiotics)    Answer Assessment - Initial Assessment Questions  1. SUBSTANCE: \"What was swallowed? \" If necessary, have the caller look at the label on the container. Medication     2. AMOUNT: \"How much was swallowed? \" (Err on the side of recording the maximal amount that is missing)      2nd dose of medication     3. ONSET: \"When was it probably swallowed? \" (Minutes or hours ago)       8 am   4. SYMPTOMS: \"Do you have any symptoms? \" If so, ask: \"What are they? \" (e.g., abdominal pain, vomiting, weakness)      Denies     5. SUICIDAL: \"Did you take this to hurt or kill yourself? \"     Accidental     6. PREGNANCY: \"Is there any chance you are pregnant? \" \"When was your last menstrual period? \"     n/a    Protocols used: POISONING-ADULT-AH

## 2021-11-17 NOTE — PROGRESS NOTES
2021    TELEHEALTH EVALUATION -- Audio/Visual (During LFMNY-60 public health emergency)    HPI:    Jeremie Calderon (:  1995) has requested an audio/video evaluation for the following concern(s):    Pt presents today via doxy. me Video visit for discuss accidental ingestion of evening medications this morning. Fluvoxamine, Respiridone, and Amlodipine. Takes Fluvoxamine 50 mg at night, Risperdal 1 mg once a day in the morning. Takes Amlodipine 10 mg.     Present with mother who states that he took Risperidone, Fluvoxamine, amlodipine this morning, usually takes at night, was 12 hours since his evening dose. Usually takes all meds at 1930. BP today at [facility] 130/83. Denies dizziness or HA's. Mother spoke with pt's psychiatrist who feels that ptcan wait until tomorrow evening to take the Fluvoxamine and Risperidone again. Mother states pt has excess earwax that will need removed. Review of Systems   HENT:        Bilateral excess cerumen   Neurological: Negative for dizziness and headaches. Prior to Visit Medications    Medication Sig Taking?  Authorizing Provider   Cholecalciferol (VITAMIN D3) 50 MCG ( UT) CAPS Take by mouth Yes Historical Provider, MD   Ascorbic Acid (VITAMIN C) 250 MG tablet Take 250 mg by mouth daily Yes Historical Provider, MD   Multiple Vitamins-Minerals (THERAPEUTIC MULTIVITAMIN-MINERALS) tablet Take 1 tablet by mouth daily Yes Historical Provider, MD   pantoprazole (PROTONIX) 40 MG tablet TAKE ONE TABLET BY MOUTH DAILY Yes Lynn Ugalde, DO   amLODIPine (NORVASC) 10 MG tablet TAKE ONE TABLET BY MOUTH DAILY Yes Andrew Mello, DO   fluvoxaMINE (LUVOX) 50 MG tablet Take 50 mg by mouth nightly  Yes Historical Provider, MD   risperiDONE (RISPERDAL) 1 MG tablet TK 1 T PO  QAM Yes Historical Provider, MD   loratadine (CLARITIN) 10 MG capsule Take 10 mg by mouth daily Yes Historical Provider, MD   pantoprazole (PROTONIX) 40 MG tablet TAKE ONE TABLET BY MOUTH DAILY  Phyllis MichaelDO   fluticasone (FLONASE) 50 MCG/ACT nasal spray 1 spray by Nasal route daily for 7 days  Phyllis Michael, DO       Social History     Tobacco Use    Smoking status: Never Smoker    Smokeless tobacco: Former User   Vaping Use    Vaping Use: Never used   Substance Use Topics    Alcohol use: Never    Drug use: Never        No Known Allergies,   Past Medical History:   Diagnosis Date    Allergic rhinitis     Anxiety     Asthma     Bipolar disorder (HCC)     Chronic back pain     Depression     GERD (gastroesophageal reflux disease)     Hypertension     Seizures (HCC)     Urinary incontinence    ,   Past Surgical History:   Procedure Laterality Date    APPENDECTOMY      BACK SURGERY         PHYSICAL EXAMINATION:  [ INSTRUCTIONS:  \"[x]\" Indicates a positive item  \"[]\" Indicates a negative item  -- DELETE ALL ITEMS NOT EXAMINED]  Vital Signs: (As obtained by patient/caregiver or practitioner observation)    - none     Constitutional: [x] Appears well-developed and well-nourished [x] No apparent distress      [] Abnormal-   Mental status  [x] Alert and awake  [x] Oriented to person/place/time [x]Able to follow commands      Eyes:  EOM    [x]  Normal  [] Abnormal-  Sclera  []  Normal  [] Abnormal -         Discharge []  None visible  [] Abnormal -    HENT:   [x] Normocephalic, atraumatic.   [] Abnormal   [] Mouth/Throat: Mucous membranes are moist.     External Ears [x] Normal  [] Abnormal-     Neck: [x] No visualized mass     Pulmonary/Chest: [x] Respiratory effort normal.  [x] No visualized signs of difficulty breathing or respiratory distress        [] Abnormal-      Musculoskeletal:   [] Normal gait with no signs of ataxia         [x] Normal range of motion of neck        [] Abnormal-       Neurological:        [x] No Facial Asymmetry (Cranial nerve 7 motor function) (limited exam to video visit)          [x] No gaze palsy        [] Abnormal-         Skin:        [x] No significant exanthematous lesions or discoloration noted on facial skin         [] Abnormal-            Psychiatric:       [x] Normal Affect [] No Hallucinations        [] Abnormal-     Other pertinent observable physical exam findings-     ASSESSMENT/PLAN:  1. Accidental medication error, initial encounter  - as per psych, ok to wait until tomorrow evening at 1930 to take Fluvoxamine and Risperidone again.  - ok to wait until tomorrow 1600 to take Amlodipine again.   - BP today good. Pt's mother will call office to schedule ear irrigation. Ovidio President, was evaluated through a synchronous (real-time) audio-video encounter. The patient (or guardian if applicable) is aware that this is a billable service. Verbal consent to proceed has been obtained within the past 12 months. The visit was conducted pursuant to the emergency declaration under the [address] [place] [address], [address] [place] [address] authority and the [employer] and [employer] General Act. Patient identification was verified, and a caregiver was present when appropriate. The patient was located in a state where the provider was credentialed to provide care. Total time spent on this encounter: Not billed by time    --Ashley Montes DO on 11/17/2021 at 4:11 PM    An electronic signature was used to authenticate this note.

## 2022-01-07 ENCOUNTER — VIRTUAL VISIT (OUTPATIENT)
Dept: FAMILY MEDICINE CLINIC | Age: 27
End: 2022-01-07
Payer: MEDICARE

## 2022-01-07 DIAGNOSIS — R11.2 NAUSEA AND VOMITING, INTRACTABILITY OF VOMITING NOT SPECIFIED, UNSPECIFIED VOMITING TYPE: Primary | ICD-10-CM

## 2022-01-07 PROCEDURE — 99213 OFFICE O/P EST LOW 20 MIN: CPT | Performed by: FAMILY MEDICINE

## 2022-01-07 PROCEDURE — G8427 DOCREV CUR MEDS BY ELIG CLIN: HCPCS | Performed by: FAMILY MEDICINE

## 2022-01-07 RX ORDER — ONDANSETRON 4 MG/1
4 TABLET, FILM COATED ORAL 3 TIMES DAILY PRN
Qty: 15 TABLET | Refills: 0 | Status: SHIPPED | OUTPATIENT
Start: 2022-01-07 | End: 2022-05-11 | Stop reason: SDUPTHER

## 2022-01-07 ASSESSMENT — ENCOUNTER SYMPTOMS
DIARRHEA: 0
NAUSEA: 1
COUGH: 0
RHINORRHEA: 0
VOMITING: 1

## 2022-01-07 NOTE — PROGRESS NOTES
2022    TELEHEALTH EVALUATION -- Audio/Visual (During ZEO-10 public health emergency)    HPI:    Denisse Camacho (:  1995) has requested an audio/video evaluation for the following concern(s):    Pt presents today via doxy. me Video visit for a 4 day hx of fever (100 F), n/v, and fatigue. Had 2 negative COVID tests on 22. Pt's mother present for today's visit. Denies diarrhea. Has had COVID at his day program, sister had same sx. Pt states his nose hurts, denies cough or runny nose. Vomit is not bloody, was green previously and now clear. Yesterday sx improved. No vomiting today. Is having normal BM's. Has had Powerade. Review of Systems   Constitutional: Positive for fatigue and fever. HENT: Negative for rhinorrhea. Respiratory: Negative for cough. Gastrointestinal: Positive for nausea and vomiting. Negative for diarrhea. Prior to Visit Medications    Medication Sig Taking?  Authorizing Provider   ondansetron (ZOFRAN) 4 MG tablet Take 1 tablet by mouth 3 times daily as needed for Nausea or Vomiting Yes Pattie Aid, DO   pantoprazole (PROTONIX) 40 MG tablet TAKE ONE TABLET BY MOUTH DAILY Yes Pattie Aid, DO   amLODIPine (NORVASC) 10 MG tablet TAKE ONE TABLET BY MOUTH DAILY Yes Pattie Aid, DO   Cholecalciferol (VITAMIN D3) 50 MCG ( UT) CAPS Take by mouth Yes Historical Provider, MD   Ascorbic Acid (VITAMIN C) 250 MG tablet Take 250 mg by mouth daily Yes Historical Provider, MD   Multiple Vitamins-Minerals (THERAPEUTIC MULTIVITAMIN-MINERALS) tablet Take 1 tablet by mouth daily Yes Historical Provider, MD   fluticasone (FLONASE) 50 MCG/ACT nasal spray 1 spray by Nasal route daily for 7 days Yes Pattie Aid, DO   fluvoxaMINE (LUVOX) 50 MG tablet Take 50 mg by mouth nightly  Yes Historical Provider, MD   risperiDONE (RISPERDAL) 1 MG tablet TK 1 T PO  QAM Yes Historical Provider, MD   loratadine (CLARITIN) 10 MG capsule Take 10 mg by mouth daily Yes Historical Provider, MD   pantoprazole (PROTONIX) 40 MG tablet TAKE ONE TABLET BY MOUTH DAILY  Maricel Chen DO       Social History     Tobacco Use    Smoking status: Never Smoker    Smokeless tobacco: Former User   Vaping Use    Vaping Use: Never used   Substance Use Topics    Alcohol use: Never    Drug use: Never        Allergies   Allergen Reactions    Fish-Derived Products Anaphylaxis     SOB   ,   Past Medical History:   Diagnosis Date    Allergic rhinitis     Anxiety     Asthma     Bipolar disorder (HCC)     Chronic back pain     Depression     GERD (gastroesophageal reflux disease)     Hypertension     Seizures (HCC)     Urinary incontinence    ,   Past Surgical History:   Procedure Laterality Date    APPENDECTOMY      BACK SURGERY         PHYSICAL EXAMINATION:  [ INSTRUCTIONS:  \"[x]\" Indicates a positive item  \"[]\" Indicates a negative item  -- DELETE ALL ITEMS NOT EXAMINED]  Vital Signs: (As obtained by patient/caregiver or practitioner observation)    Height - 5' 4\"  Weight  - 240 lb    Heart rate- 125   Respiratory rate-    Temperature- 100.0 F  Pulse oximetry- 96 %    Constitutional: [x] Appears well-developed and well-nourished [x] No apparent distress      [] Abnormal-   Mental status  [x] Alert and awake  [x] Oriented to person/place/time [x]Able to follow commands      Eyes:  EOM    [x]  Normal  [] Abnormal-  Sclera  []  Normal  [] Abnormal -         Discharge []  None visible  [] Abnormal -    HENT:   [x] Normocephalic, atraumatic.   [] Abnormal   [] Mouth/Throat: Mucous membranes are moist.     External Ears [x] Normal  [] Abnormal-     Neck: [x] No visualized mass     Pulmonary/Chest: [x] Respiratory effort normal.  [x] No visualized signs of difficulty breathing or respiratory distress        [] Abnormal-      Musculoskeletal:   [] Normal gait with no signs of ataxia         [x] Normal range of motion of neck        [] Abnormal-       Neurological:        [x] No Facial Asymmetry (Cranial nerve 7 motor function) (limited exam to video visit)          [x] No gaze palsy        [] Abnormal-         Skin:        [x] No significant exanthematous lesions or discoloration noted on facial skin         [] Abnormal-            Psychiatric:       [x] Normal Affect [] No Hallucinations        [] Abnormal-     Other pertinent observable physical exam findings-     ASSESSMENT/PLAN:  1. Nausea and vomiting, intractability of vomiting not specified, unspecified vomiting type  - ondansetron (ZOFRAN) 4 MG tablet; Take 1 tablet by mouth 3 times daily as needed for Nausea or Vomiting  Dispense: 15 tablet; Refill: 0  - Gatorade/Powerade sipping throughout the day  - crackers and toast, avoid greasy foods  - Tylenol for fever, counseled pt's mother to take pt to ED for temp > 103 F   - monitor for developing cough or runny nose    Return if symptoms worsen or fail to improve. Meredith Aquino, was evaluated through a synchronous (real-time) audio-video encounter. The patient (or guardian if applicable) is aware that this is a billable service. Verbal consent to proceed has been obtained within the past 12 months. The visit was conducted pursuant to the emergency declaration under the [facility], [address] [place] [address] authority and the [employer] General Act. Patient identification was verified, and a caregiver was present when appropriate. The patient was located in a state where the provider was credentialed to provide care. Total time spent on this encounter: Not billed by time    --Malena Pedraza DO on 1/7/2022 at 11:28 AM    An electronic signature was used to authenticate this note.

## 2022-02-11 ENCOUNTER — NURSE ONLY (OUTPATIENT)
Dept: FAMILY MEDICINE CLINIC | Age: 27
End: 2022-02-11
Payer: MEDICARE

## 2022-02-11 DIAGNOSIS — R39.198 DIFFICULTY URINATING: Primary | ICD-10-CM

## 2022-02-11 LAB
BILIRUBIN, POC: NORMAL
BLOOD URINE, POC: NORMAL
CLARITY, POC: NORMAL
COLOR, POC: YELLOW
GLUCOSE URINE, POC: NORMAL
KETONES, POC: NORMAL
LEUKOCYTE EST, POC: NORMAL
NITRITE, POC: NORMAL
PH, POC: 6
PROTEIN, POC: NORMAL
SPECIFIC GRAVITY, POC: 1.03
UROBILINOGEN, POC: 0.2

## 2022-02-11 PROCEDURE — 81003 URINALYSIS AUTO W/O SCOPE: CPT | Performed by: FAMILY MEDICINE

## 2022-02-13 LAB — URINE CULTURE, ROUTINE: NORMAL

## 2022-03-10 ENCOUNTER — TELEPHONE (OUTPATIENT)
Dept: FAMILY MEDICINE CLINIC | Age: 27
End: 2022-03-10

## 2022-03-10 NOTE — TELEPHONE ENCOUNTER
Pt has an appointment with me on Friday afternoon for a form to be completed. I do not have the form, so patient's mother will need to drop it off in the morning to the office so that I could pick it up at lunchtime. Thank you.

## 2022-03-11 ENCOUNTER — TELEMEDICINE (OUTPATIENT)
Dept: FAMILY MEDICINE CLINIC | Age: 27
End: 2022-03-11
Payer: MEDICARE

## 2022-03-11 ENCOUNTER — TELEPHONE (OUTPATIENT)
Dept: FAMILY MEDICINE CLINIC | Age: 27
End: 2022-03-11

## 2022-03-11 DIAGNOSIS — K59.9 COLONIC DYSMOTILITY: ICD-10-CM

## 2022-03-11 DIAGNOSIS — R74.01 TRANSAMINITIS: Primary | ICD-10-CM

## 2022-03-11 PROCEDURE — 99213 OFFICE O/P EST LOW 20 MIN: CPT | Performed by: FAMILY MEDICINE

## 2022-03-11 PROCEDURE — G8427 DOCREV CUR MEDS BY ELIG CLIN: HCPCS | Performed by: FAMILY MEDICINE

## 2022-03-11 ASSESSMENT — ENCOUNTER SYMPTOMS
NAUSEA: 0
VOMITING: 0
CONSTIPATION: 0

## 2022-03-11 NOTE — PROGRESS NOTES
pantoprazole (PROTONIX) 40 MG tablet TAKE ONE TABLET BY MOUTH DAILY  Catarino Walker DO   fluticasone (FLONASE) 50 MCG/ACT nasal spray 1 spray by Nasal route daily for 7 days  Catarino Walker DO   fluvoxaMINE (LUVOX) 50 MG tablet Take 50 mg by mouth nightly Takes 1 1/2 tablets at night  Historical Provider, MD   risperiDONE (RISPERDAL) 1 MG tablet Take 1 mg by mouth nightly   Historical Provider, MD   loratadine (CLARITIN) 10 MG capsule Take 10 mg by mouth daily  Historical Provider, MD       Social History     Tobacco Use    Smoking status: Never Smoker    Smokeless tobacco: Former User   Vaping Use    Vaping Use: Never used   Substance Use Topics    Alcohol use: Never    Drug use: Never        Allergies   Allergen Reactions    Fish-Derived Products Anaphylaxis     SOB   ,   Past Medical History:   Diagnosis Date    Allergic rhinitis     Anxiety     Asthma     Bipolar disorder (HCC)     Chronic back pain     Depression     GERD (gastroesophageal reflux disease)     Hypertension     Seizures (HCC)     Urinary incontinence    ,   Past Surgical History:   Procedure Laterality Date    APPENDECTOMY      BACK SURGERY         PHYSICAL EXAMINATION:  [ INSTRUCTIONS:  \"[x]\" Indicates a positive item  \"[]\" Indicates a negative item  -- DELETE ALL ITEMS NOT EXAMINED]  Vital Signs: (As obtained by patient/caregiver or practitioner observation)    Height  -  5' 3\"           Weight -  216 lb                Constitutional: [x] Appears well-developed and well-nourished [x] No apparent distress      [] Abnormal-   Mental status  [x] Alert and awake  [x] Oriented to person/place/time [x]Able to follow commands      Eyes:  EOM    [x]  Normal  [] Abnormal-  Sclera  []  Normal  [] Abnormal -         Discharge []  None visible  [] Abnormal -    HENT:   [x] Normocephalic, atraumatic.   [] Abnormal   [] Mouth/Throat: Mucous membranes are moist.     External Ears [x] Normal  [] Abnormal-     Neck: [x] No visualized mass     Pulmonary/Chest: [x] Respiratory effort normal.  [x] No visualized signs of difficulty breathing or respiratory distress        [] Abnormal-      Musculoskeletal:   [] Normal gait with no signs of ataxia         [x] Normal range of motion of neck        [] Abnormal-       Neurological:        [x] No Facial Asymmetry (Cranial nerve 7 motor function) (limited exam to video visit)          [x] No gaze palsy        [] Abnormal-         Skin:        [x] No significant exanthematous lesions or discoloration noted on facial skin         [] Abnormal-            Psychiatric:       [x] Normal Affect [] No Hallucinations        [] Abnormal-     Other pertinent observable physical exam findings-     ASSESSMENT/PLAN:  1. Transaminitis  - Hepatic Function Panel; Future    2. Colonic dysmotility  - Ambulatory referral to Gastroenterology    38 minutes spent on today's visit and 2 letters created for pt's day program and group home. Patient's mother will  both letters. Denisse Camacho, was evaluated through a synchronous (real-time) audio-video encounter. The patient (or guardian if applicable) is aware that this is a billable service. Verbal consent to proceed has been obtained within the past 12 months. The visit was conducted pursuant to the emergency declaration under the [address] [place] [address], [address] [place] [address] authority and the [employer] and [employer] General Act. Patient identification was verified, and a caregiver was present when appropriate. The patient was located in a state where the provider was credentialed to provide care. Total time spent on this encounter: Not billed by time    --Pattie Kinney DO on 3/11/2022 at 3:56 PM    An electronic signature was used to authenticate this note.

## 2022-05-11 ENCOUNTER — TELEPHONE (OUTPATIENT)
Dept: FAMILY MEDICINE CLINIC | Age: 27
End: 2022-05-11

## 2022-05-11 DIAGNOSIS — R11.2 NAUSEA AND VOMITING, INTRACTABILITY OF VOMITING NOT SPECIFIED, UNSPECIFIED VOMITING TYPE: ICD-10-CM

## 2022-05-11 RX ORDER — ONDANSETRON 4 MG/1
4 TABLET, FILM COATED ORAL 3 TIMES DAILY PRN
Qty: 15 TABLET | Refills: 0 | Status: SHIPPED | OUTPATIENT
Start: 2022-05-11 | End: 2022-06-23

## 2022-05-11 NOTE — TELEPHONE ENCOUNTER
Patient needs a refill on zofran (disolving tablet). They need a 30 day supply. Mail order or local pharmacy: local    Pharmacy: [facility] on file     Patient  or mail to patient(If mail order):     Last visit VV 3/11/22    No future appointments.

## 2022-05-24 ASSESSMENT — ENCOUNTER SYMPTOMS
RHINORRHEA: 1
COUGH: 1
SORE THROAT: 1

## 2022-05-24 NOTE — PROGRESS NOTES
2022    TELEHEALTH EVALUATION -- Audio/Visual (During VMLUN-33 public health emergency)    HPI:    Moe Cordova (:  1995) has requested an audio/video evaluation for the following concern(s):    Chief Complaint   Patient presents with    Cough     Cold symptoms    Pharyngitis     Runny nose      Nasal discharge is clear, denies f/c, Sx began 6 days ago. Negative COVID test at home. Admits to seasonal allergies and gets sick this time of year. Review of Systems   HENT: Positive for rhinorrhea and sore throat. Respiratory: Positive for cough. Prior to Visit Medications    Medication Sig Taking?  Authorizing Provider   azithromycin (ZITHROMAX Z-ESTELLA) 250 MG tablet Take 1 tablet by mouth daily As directed on pack Yes Marge Sahni DO   fluticasone (FLONASE) 50 MCG/ACT nasal spray 1 spray by Nasal route daily for 7 days Yes Marge Sahni DO   benzonatate (TESSALON PERLES) 100 MG capsule Take 1 capsule by mouth 3 times daily as needed for Cough Yes Marge Sahni DO   ondansetron (ZOFRAN) 4 MG tablet Take 1 tablet by mouth 3 times daily as needed for Nausea or Vomiting  Marge Sahni DO   mineral oil (KONDREMUL) 50 % EMUL emulsion Take 30 mLs by mouth daily  Historical Provider, MD   bisacodyl (DULCOLAX) 10 MG suppository Place 10 mg rectally daily  Historical Provider, MD   Simethicone (GAS RELIEF 125 MAX ST PO) Take 125 % by mouth 4 times daily  Historical Provider, MD   pantoprazole (PROTONIX) 40 MG tablet TAKE ONE TABLET BY MOUTH DAILY  Marge Sahni DO   amLODIPine (NORVASC) 10 MG tablet TAKE ONE TABLET BY MOUTH DAILY  Marge Sahni DO   Cholecalciferol (VITAMIN D3) 50 MCG ( UT) CAPS Take by mouth  Historical Provider, MD   Ascorbic Acid (VITAMIN C) 250 MG tablet Take 250 mg by mouth daily  Historical Provider, MD   Multiple Vitamins-Minerals (THERAPEUTIC MULTIVITAMIN-MINERALS) tablet Take 1 tablet by mouth daily  Historical Provider, MD   pantoprazole (PROTONIX) 40 MG tablet TAKE ONE TABLET BY MOUTH DAILY  Jaime Meyers DO   fluvoxaMINE (LUVOX) 50 MG tablet Take 50 mg by mouth nightly Takes 1 1/2 tablets at night  Historical Provider, MD   risperiDONE (RISPERDAL) 1 MG tablet Take 1 mg by mouth nightly   Historical Provider, MD   loratadine (CLARITIN) 10 MG capsule Take 10 mg by mouth daily  Historical Provider, MD       Social History     Tobacco Use    Smoking status: Never Smoker    Smokeless tobacco: Former User   Vaping Use    Vaping Use: Never used   Substance Use Topics    Alcohol use: Never    Drug use: Never        Allergies   Allergen Reactions    Fish-Derived Products Anaphylaxis     SOB   ,   Past Medical History:   Diagnosis Date    Allergic rhinitis     Anxiety     Asthma     Bipolar disorder (HCC)     Chronic back pain     Depression     GERD (gastroesophageal reflux disease)     Hypertension     Seizures (HCC)     Urinary incontinence    ,   Past Surgical History:   Procedure Laterality Date    APPENDECTOMY      BACK SURGERY         PHYSICAL EXAMINATION:  [ INSTRUCTIONS:  \"[x]\" Indicates a positive item  \"[]\" Indicates a negative item  -- DELETE ALL ITEMS NOT EXAMINED]  Vital Signs: (As obtained by patient/caregiver or practitioner observation)    - none available    Constitutional: [x] Appears well-developed and well-nourished [x] No apparent distress      [] Abnormal-   Mental status  [x] Alert and awake  [x] Oriented to person/place/time [x]Able to follow commands      Eyes:  EOM    [x]  Normal  [] Abnormal-  Sclera  []  Normal  [] Abnormal -         Discharge []  None visible  [] Abnormal -    HENT:   [x] Normocephalic, atraumatic.   [] Abnormal   [] Mouth/Throat: Mucous membranes are moist.     External Ears [x] Normal  [] Abnormal-     Neck: [x] No visualized mass     Pulmonary/Chest: [x] Respiratory effort normal.  [x] No visualized signs of difficulty breathing or respiratory distress        [] Abnormal- Musculoskeletal:   [] Normal gait with no signs of ataxia         [x] Normal range of motion of neck        [] Abnormal-       Neurological:        [x] No Facial Asymmetry (Cranial nerve 7 motor function) (limited exam to video visit)          [x] No gaze palsy        [] Abnormal-         Skin:        [x] No significant exanthematous lesions or discoloration noted on facial skin         [] Abnormal-            Psychiatric:       [x] Normal Affect [] No Hallucinations        [] Abnormal-     Other pertinent observable physical exam findings-     ASSESSMENT/PLAN:  1. Cough  - azithromycin (ZITHROMAX Z-ESTELLA) 250 MG tablet; Take 1 tablet by mouth daily As directed on pack  Dispense: 1 packet; Refill: 0  - benzonatate (TESSALON PERLES) 100 MG capsule; Take 1 capsule by mouth 3 times daily as needed for Cough  Dispense: 30 capsule; Refill: 1    2. Nasal discharge  - azithromycin (ZITHROMAX Z-ESTELLA) 250 MG tablet; Take 1 tablet by mouth daily As directed on pack  Dispense: 1 packet; Refill: 0  - fluticasone (FLONASE) 50 MCG/ACT nasal spray; 1 spray by Nasal route daily for 7 days  Dispense: 1 each; Refill: 0      Return if symptoms worsen or fail to improve. Alex Friend, was evaluated through a synchronous (real-time) audio-video encounter. The patient (or guardian if applicable) is aware that this is a billable service. Verbal consent to proceed has been obtained within the past 12 months. The visit was conducted pursuant to the emergency declaration under the [address] [place] [address] and the [employer] and [employer] General Act. Patient identification was verified, and a caregiver was present when appropriate. The patient was located in a state where the provider was credentialed to provide care.     Total time spent on this encounter: Not billed by time    --Rosa Obrien DO on 5/25/2022 at 9:41 AM    An electronic signature was used to authenticate this note.

## 2022-05-25 ENCOUNTER — TELEMEDICINE (OUTPATIENT)
Dept: FAMILY MEDICINE CLINIC | Age: 27
End: 2022-05-25
Payer: MEDICARE

## 2022-05-25 DIAGNOSIS — R05.9 COUGH: Primary | ICD-10-CM

## 2022-05-25 DIAGNOSIS — J34.89 NASAL DISCHARGE: ICD-10-CM

## 2022-05-25 PROCEDURE — 99213 OFFICE O/P EST LOW 20 MIN: CPT | Performed by: FAMILY MEDICINE

## 2022-05-25 PROCEDURE — G8428 CUR MEDS NOT DOCUMENT: HCPCS | Performed by: FAMILY MEDICINE

## 2022-05-25 RX ORDER — BENZONATATE 100 MG/1
100 CAPSULE ORAL 3 TIMES DAILY PRN
Qty: 30 CAPSULE | Refills: 1 | Status: SHIPPED | OUTPATIENT
Start: 2022-05-25 | End: 2022-06-04

## 2022-05-25 RX ORDER — AZITHROMYCIN 250 MG/1
250 TABLET, FILM COATED ORAL DAILY
Qty: 1 PACKET | Refills: 0 | Status: SHIPPED | OUTPATIENT
Start: 2022-05-25

## 2022-05-25 RX ORDER — FLUTICASONE PROPIONATE 50 MCG
1 SPRAY, SUSPENSION (ML) NASAL DAILY
Qty: 1 EACH | Refills: 0 | Status: SHIPPED | OUTPATIENT
Start: 2022-05-25 | End: 2022-06-01

## 2022-06-23 DIAGNOSIS — R11.2 NAUSEA AND VOMITING, INTRACTABILITY OF VOMITING NOT SPECIFIED, UNSPECIFIED VOMITING TYPE: ICD-10-CM

## 2022-06-23 RX ORDER — ONDANSETRON 4 MG/1
TABLET, ORALLY DISINTEGRATING ORAL
Qty: 15 TABLET | Refills: 0 | Status: SHIPPED | OUTPATIENT
Start: 2022-06-23 | End: 2022-07-21

## 2022-07-19 RX ORDER — PANTOPRAZOLE SODIUM 40 MG/1
TABLET, DELAYED RELEASE ORAL
Qty: 90 TABLET | Refills: 0 | Status: SHIPPED | OUTPATIENT
Start: 2022-07-19

## 2022-07-21 DIAGNOSIS — R11.2 NAUSEA AND VOMITING, INTRACTABILITY OF VOMITING NOT SPECIFIED, UNSPECIFIED VOMITING TYPE: ICD-10-CM

## 2022-07-21 RX ORDER — ONDANSETRON 4 MG/1
TABLET, ORALLY DISINTEGRATING ORAL
Qty: 15 TABLET | Refills: 2 | Status: SHIPPED | OUTPATIENT
Start: 2022-07-21 | End: 2022-10-19

## 2022-09-26 RX ORDER — AMLODIPINE BESYLATE 10 MG/1
TABLET ORAL
Qty: 90 TABLET | Refills: 1 | Status: SHIPPED | OUTPATIENT
Start: 2022-09-26

## 2022-10-19 DIAGNOSIS — R11.2 NAUSEA AND VOMITING: ICD-10-CM

## 2022-10-19 RX ORDER — ONDANSETRON 4 MG/1
TABLET, ORALLY DISINTEGRATING ORAL
Qty: 15 TABLET | Refills: 2 | Status: SHIPPED | OUTPATIENT
Start: 2022-10-19

## 2022-11-02 ENCOUNTER — TELEPHONE (OUTPATIENT)
Dept: FAMILY MEDICINE CLINIC | Age: 27
End: 2022-11-02

## 2022-11-02 NOTE — TELEPHONE ENCOUNTER
Patient's mom requested a refill of the following med to be sent to the [facility] in [place] on file. pantoprazole (PROTONIX) 40 MG tablet       5/25/2022 last ov    No future appointments.

## 2022-11-06 RX ORDER — PANTOPRAZOLE SODIUM 40 MG/1
TABLET, DELAYED RELEASE ORAL
Qty: 90 TABLET | Refills: 1 | Status: SHIPPED | OUTPATIENT
Start: 2022-11-06

## 2023-02-02 ENCOUNTER — TELEMEDICINE (OUTPATIENT)
Dept: PRIMARY CARE CLINIC | Age: 28
End: 2023-02-02

## 2023-02-02 DIAGNOSIS — B96.89 ACUTE BACTERIAL RHINOSINUSITIS: Primary | ICD-10-CM

## 2023-02-02 DIAGNOSIS — J01.90 ACUTE BACTERIAL RHINOSINUSITIS: Primary | ICD-10-CM

## 2023-02-02 RX ORDER — LACTOBACILLUS RHAMNOSUS GG 10B CELL
1 CAPSULE ORAL DAILY
Qty: 30 CAPSULE | Refills: 0 | Status: SHIPPED | OUTPATIENT
Start: 2023-02-02

## 2023-02-02 RX ORDER — AMOXICILLIN 875 MG/1
875 TABLET, COATED ORAL 2 TIMES DAILY
Qty: 14 TABLET | Refills: 0 | Status: SHIPPED | OUTPATIENT
Start: 2023-02-02 | End: 2023-02-09

## 2023-02-02 SDOH — ECONOMIC STABILITY: INCOME INSECURITY: HOW HARD IS IT FOR YOU TO PAY FOR THE VERY BASICS LIKE FOOD, HOUSING, MEDICAL CARE, AND HEATING?: VERY HARD

## 2023-02-02 SDOH — ECONOMIC STABILITY: FOOD INSECURITY: WITHIN THE PAST 12 MONTHS, THE FOOD YOU BOUGHT JUST DIDN'T LAST AND YOU DIDN'T HAVE MONEY TO GET MORE.: OFTEN TRUE

## 2023-02-02 SDOH — ECONOMIC STABILITY: FOOD INSECURITY: WITHIN THE PAST 12 MONTHS, YOU WORRIED THAT YOUR FOOD WOULD RUN OUT BEFORE YOU GOT MONEY TO BUY MORE.: SOMETIMES TRUE

## 2023-02-02 SDOH — ECONOMIC STABILITY: TRANSPORTATION INSECURITY
IN THE PAST 12 MONTHS, HAS LACK OF TRANSPORTATION KEPT YOU FROM MEETINGS, WORK, OR FROM GETTING THINGS NEEDED FOR DAILY LIVING?: YES

## 2023-02-02 ASSESSMENT — ENCOUNTER SYMPTOMS
COUGH: 1
SORE THROAT: 1
SINUS PRESSURE: 1
SHORTNESS OF BREATH: 0
SINUS COMPLAINT: 1

## 2023-02-02 NOTE — PROGRESS NOTES
Gala Ross (:  1995) is a Established patient, here for evaluation of the following:    Sinus Problem (Fever started yesterday, congestion  and bad smell coming from nose and mouth x 8 days)       Assessment & Plan:  Below is the assessment and plan developed based on review of pertinent history, physical exam, labs, studies, and medications. 1. Acute bacterial rhinosinusitis  -     lactobacillus (CULTURELLE) CAPS capsule; Take 1 capsule by mouth daily, Disp-30 capsule, R-0Normal  -     amoxicillin (AMOXIL) 875 MG tablet; Take 1 tablet by mouth 2 times daily for 7 days, Disp-14 tablet, R-0Normal  Patient unable to tolerate nasal sprays or sinus irrigation due to sensory issues. Advised to f/u with virtualist by Naina if no improvement in symptoms. Patient and guardian verbalized understanding. Return if symptoms worsen or fail to improve, for acute bacterial rhinosinusitis. Subjective: history provided by patient and Domenic Quiroz, patient's guardian. Negative home covid test  Sinus Problem  This is a new problem. The current episode started 1 to 4 weeks ago. The problem has been gradually improving since onset. The maximum temperature recorded prior to his arrival was 102 - 102.9 F. The fever has been present for Less than 1 day. The pain is moderate. Associated symptoms include congestion, coughing, headaches (forehead), sinus pressure, sneezing and a sore throat. Pertinent negatives include no shortness of breath. (Significant mucus production) Past treatments include antibiotics and acetaminophen. The treatment provided moderate relief. Review of Systems   HENT:  Positive for congestion, sinus pressure, sneezing and sore throat. Respiratory:  Positive for cough. Negative for shortness of breath. Neurological:  Positive for headaches (forehead).    Domenic Quiroz gave Sol Ferrell a dose of amoxicillin that she had left over and his fever went away  Objective:  Patient-Reported Vitals  No data recorded Patient-Reported Vitals 2/2/2023   Patient-Reported Weight 240   Patient-Reported Height 5'3\"   Patient-Reported Pulse -   Patient-Reported Temperature -   Patient-Reported SpO2 -        Physical Exam:  [INSTRUCTIONS:  \"[x]\" Indicates a positive item  \"[]\" Indicates a negative item  -- DELETE ALL ITEMS NOT EXAMINED]    Constitutional: [x] Appears well-developed and well-nourished [x] No apparent distress      [] Abnormal -     Mental status: [x] Alert and awake  [x] Oriented to person/place/time [x] Able to follow commands    [] Abnormal -     Eyes:   EOM    [x]  Normal    [] Abnormal -   Sclera  [x]  Normal    [] Abnormal -          Discharge [x]  None visible   [] Abnormal -     HENT: [x] Normocephalic, atraumatic  [] Abnormal -   [x] Mouth/Throat: Mucous membranes are moist    External Ears [x] Normal  [] Abnormal -    Neck: [x] No visualized mass [] Abnormal -     Pulmonary/Chest: [x] Respiratory effort normal   [x] No visualized signs of difficulty breathing or respiratory distress        [] Abnormal -      Musculoskeletal:   [] Normal gait with no signs of ataxia         [x] Normal range of motion of neck        [] Abnormal -     Neurological:        [x] No Facial Asymmetry (Cranial nerve 7 motor function) (limited exam due to video visit)          [x] No gaze palsy        [] Abnormal -          Skin:        [x] No significant exanthematous lesions or discoloration noted on facial skin         [] Abnormal -            Psychiatric:       [x] Normal Affect [] Abnormal -        [] No Hallucinations    Other pertinent observable physical exam findings:-        On this date 2/2/2023 I have spent 20 minutes reviewing previous notes, test results and face to face (virtual) with the patient discussing the diagnosis and importance of compliance with the treatment plan as well as documenting on the day of the visit. Marisol Wang was evaluated through a synchronous (real-time) audio-video encounter.  The patient (or guardian if applicable) is aware that this is a billable service, which includes applicable co-pays. This Virtual Visit was conducted with patient's (and/or legal guardian's) consent. The visit was conducted pursuant to the emergency declaration under the [address] [place] [address], [address] [place] [address] authority and the [employer] and [employer] General Act. Patient identification was verified, and a caregiver was present when appropriate.    The patient was located at Home: [address] [place] [address]  Provider was located at Home ([address]): [place]         --Salazar Ramos [place] 77, APRN - CNP

## 2023-02-02 NOTE — LETTER
I had the pleasure of seeing Abrahan Addidinesh today for a primary care Virtualist video visit. Our team would love your overall feedback on this visit. Please hit shift and click the following link to let us know if the Virtualist service met your expectations. [facility].[employer]. [email or web address]      Electronically signed by LISA Xavier CNP on 2/2/23 at 1:55 PM EST.

## 2023-02-23 ENCOUNTER — OFFICE VISIT (OUTPATIENT)
Dept: FAMILY MEDICINE CLINIC | Age: 28
End: 2023-02-23

## 2023-02-23 VITALS
HEIGHT: 63 IN | WEIGHT: 221.8 LBS | RESPIRATION RATE: 16 BRPM | OXYGEN SATURATION: 96 % | BODY MASS INDEX: 39.3 KG/M2 | HEART RATE: 90 BPM | SYSTOLIC BLOOD PRESSURE: 137 MMHG | DIASTOLIC BLOOD PRESSURE: 85 MMHG | TEMPERATURE: 97.5 F

## 2023-02-23 DIAGNOSIS — E66.01 SEVERE OBESITY (BMI 35.0-39.9) WITH COMORBIDITY (HCC): ICD-10-CM

## 2023-02-23 DIAGNOSIS — I10 PRIMARY HYPERTENSION: ICD-10-CM

## 2023-02-23 DIAGNOSIS — K59.9 COLONIC DYSMOTILITY: ICD-10-CM

## 2023-02-23 DIAGNOSIS — H61.23 IMPACTED CERUMEN OF BOTH EARS: Primary | ICD-10-CM

## 2023-02-23 ASSESSMENT — PATIENT HEALTH QUESTIONNAIRE - PHQ9: DEPRESSION UNABLE TO ASSESS: FUNCTIONAL CAPACITY MOTIVATION LIMITS ACCURACY

## 2023-02-23 NOTE — PROGRESS NOTES
2/23/2023    This is a 32 y.o. male   Chief Complaint   Patient presents with    Ear Fullness   . HPI  Pt presents today for:    Irrigation: bilateral impacted cerumen    HTN: Taking Amlodipine 10 mg, today's /85, doesn't check BP at home, denies dizziness or HA's.   Past Medical History:   Diagnosis Date    Allergic rhinitis     Anxiety     Asthma     Bipolar disorder (HCC)     Chronic back pain     Depression     GERD (gastroesophageal reflux disease)     Hypertension     Seizures ([address]     Urinary incontinence        Past Surgical History:   Procedure Laterality Date    APPENDECTOMY      BACK SURGERY         Social History     Socioeconomic History    Marital status: Single     Spouse name: Not on file    Number of children: Not on file    Years of education: Not on file    Highest education level: Not on file   Occupational History    Not on file   Tobacco Use    Smoking status: Never    Smokeless tobacco: Former   Vaping Use    Vaping Use: Never used   Substance and Sexual Activity    Alcohol use: Never    Drug use: Never    Sexual activity: Not Currently   Other Topics Concern    Not on file   Social History Narrative    Not on file     Social Determinants of Health     Financial Resource Strain: Not on file   Food Insecurity: Not on file   Transportation Needs: Not on file   Physical Activity: Not on file   Stress: Not on file   Social Connections: Not on file   Intimate Partner Violence: Not on file   Housing Stability: Not on file       Family History   Problem Relation Age of Onset    Cancer Mother     Diabetes Mother     High Blood Pressure Mother     Heart Disease Mother        Current Outpatient Medications   Medication Sig Dispense Refill    lactobacillus (CULTURELLE) CAPS capsule Take 1 capsule by mouth daily 30 capsule 0    pantoprazole (PROTONIX) 40 MG tablet TAKE ONE TABLET BY MOUTH DAILY 90 tablet 1    ondansetron (ZOFRAN-ODT) 4 MG disintegrating tablet DISSOLVE ONE TABLET BY MOUTH THREE TIMES A DAY AS NEEDED FOR NAUSEA AND VOMITING 15 tablet 2    amLODIPine (NORVASC) 10 MG tablet TAKE ONE TABLET BY MOUTH DAILY 90 tablet 1    mineral oil (KONDREMUL) 50 % EMUL emulsion Take 30 mLs by mouth daily      bisacodyl (DULCOLAX) 10 MG suppository Place 10 mg rectally daily      Simethicone (GAS RELIEF 125 MAX ST PO) Take 125 % by mouth 4 times daily      Cholecalciferol (VITAMIN D3) 50 MCG (2000 UT) CAPS Take by mouth      Ascorbic Acid (VITAMIN C) 250 MG tablet Take 250 mg by mouth daily      Multiple Vitamins-Minerals (THERAPEUTIC MULTIVITAMIN-MINERALS) tablet Take 1 tablet by mouth daily      fluvoxaMINE (LUVOX) 50 MG tablet Take 50 mg by mouth nightly Takes 1 1/2 tablets at night  2    risperiDONE (RISPERDAL) 1 MG tablet Take 1 mg by mouth nightly   5    loratadine (CLARITIN) 10 MG capsule Take 10 mg by mouth daily      fluticasone (FLONASE) 50 MCG/ACT nasal spray 1 spray by Nasal route daily for 7 days 1 each 0     No current facility-administered medications for this visit. Immunization History   Administered Date(s) Administered    Influenza, FLUARIX, FLULAVAL, FLUZONE (age 10 mo+) AND AFLURIA, (age 1 y+), PF, 0.5mL 10/14/2019    Tdap (Boostrix, Adacel) 06/19/2017       Allergies   Allergen Reactions    Fish-Derived Products Anaphylaxis     SOB       Nurse Only on 02/11/2022   Component Date Value Ref Range Status    Color, UA 02/11/2022 yellow   Final    Clarity, UA 02/11/2022 cloudy   Final    Glucose, UA [facility] 02/11/2022 n   Final    Bilirubin, UA 02/11/2022 n   Final    Ketones, UA 02/11/2022 n   Final    Spec Grav, UA 02/11/2022 1.030   Final    Blood, UA POC 02/11/2022 n   Final    pH, UA 02/11/2022 6.0   Final    Protein, UA POC 02/11/2022 n   Final    Urobilinogen, UA 02/11/2022 0.2   Final    Leukocytes, UA 02/11/2022 n   Final    Nitrite, UA 02/11/2022 n   Final    Urine Culture, Routine 02/11/2022 <10,000 CFU/ml mixed skin/urogenital ni.  No further workup   Final       Review of Systems   HENT:          Positive for bilateral impacted cerumen   Neurological:  Negative for dizziness and headaches. /85 (Site: Left Upper Arm, Position: Sitting, Cuff Size: Large Adult)   Pulse 90   Temp 97.5 °F (36.4 °C) (Temporal)   Resp 16   Ht 5' 3\" (1.6 m)   Wt 221 lb 12.8 oz (100.6 kg)   SpO2 96%   BMI 39.29 kg/m²     Physical Exam  Vitals reviewed. Constitutional:       Appearance: He is obese. Plan   Diagnosis Orders   1. Impacted cerumen of both ears  IA REMOVAL IMPACTED CERUMEN IRRIGATION/LVG UNILAT      2. Primary hypertension  Stable, continue Amlodipine 10 mg daily      3. Severe obesity (BMI 35.0-39. 9) with comorbidity ([address]  Discussed dietary/lifestyle modification        Patient prepped and warm water used for irrigation of both ears done by Medical Assistants. Follow-up physical exam of ears shows clear canals without excess cerumen. Letter written today to Medicare asking for coverage of his undergarments, Gas-X, Mineral Oil, and Miralax. Return in about 3 months (around 5/23/2023) for AWV. Prior to Visit Medications    Medication Sig Taking?  Authorizing Provider   lactobacillus (CULTURELLE) CAPS capsule Take 1 capsule by mouth daily Yes LISA Rahman - CNP   pantoprazole (PROTONIX) 40 MG tablet TAKE ONE TABLET BY MOUTH DAILY Yes Dary Palomino DO   ondansetron (ZOFRAN-ODT) 4 MG disintegrating tablet DISSOLVE ONE TABLET BY MOUTH THREE TIMES A DAY AS NEEDED FOR NAUSEA AND VOMITING Yes Dary Palomino DO   amLODIPine (NORVASC) 10 MG tablet TAKE ONE TABLET BY MOUTH DAILY Yes Dary Palomino DO   mineral oil (KONDREMUL) 50 % EMUL emulsion Take 30 mLs by mouth daily Yes Historical Provider, MD   bisacodyl (DULCOLAX) 10 MG suppository Place 10 mg rectally daily Yes Historical Provider, MD   Simethicone (GAS RELIEF 125 MAX ST PO) Take 125 % by mouth 4 times daily Yes Historical Provider, MD   Cholecalciferol (VITAMIN D3) 50 MCG (2000 UT) CAPS Take by mouth Yes Historical Provider, MD   Ascorbic Acid (VITAMIN C) 250 MG tablet Take 250 mg by mouth daily Yes Historical Provider, MD   Multiple Vitamins-Minerals (THERAPEUTIC MULTIVITAMIN-MINERALS) tablet Take 1 tablet by mouth daily Yes Historical Provider, MD   fluvoxaMINE (LUVOX) 50 MG tablet Take 50 mg by mouth nightly Takes 1 1/2 tablets at night Yes Historical Provider, MD   risperiDONE (RISPERDAL) 1 MG tablet Take 1 mg by mouth nightly  Yes Historical Provider, MD   loratadine (CLARITIN) 10 MG capsule Take 10 mg by mouth daily Yes Historical Provider, MD   fluticasone (FLONASE) 50 MCG/ACT nasal spray 1 spray by Nasal route daily for 7 days  Debi Rutledge DO   pantoprazole (PROTONIX) 40 MG tablet TAKE ONE TABLET BY MOUTH DAILY  Debi Rutledge DO

## 2023-03-24 NOTE — TELEPHONE ENCOUNTER
Future Appointments   Date Time Provider Ced Alvarado   5/31/2023  4:15 PM Avtar Reed DO [id number] 2/23/2023

## 2023-03-25 RX ORDER — AMLODIPINE BESYLATE 10 MG/1
TABLET ORAL
Qty: 90 TABLET | Refills: 1 | Status: SHIPPED | OUTPATIENT
Start: 2023-03-25

## 2023-05-04 RX ORDER — PANTOPRAZOLE SODIUM 40 MG/1
TABLET, DELAYED RELEASE ORAL
Qty: 90 TABLET | Refills: 1 | Status: SHIPPED | OUTPATIENT
Start: 2023-05-04

## 2023-05-04 NOTE — TELEPHONE ENCOUNTER
2/23/2023        Future Appointments   Date Time Provider Ced Alvarado   5/31/2023  4:15 PM DO TRUNG Lenz [id number]

## 2023-05-23 DIAGNOSIS — I10 PRIMARY HYPERTENSION: ICD-10-CM

## 2023-05-23 DIAGNOSIS — E66.01 SEVERE OBESITY (BMI 35.0-39.9) WITH COMORBIDITY (HCC): ICD-10-CM

## 2023-05-23 LAB
ALBUMIN SERPL-MCNC: 5 G/DL (ref 3.4–5)
ALBUMIN/GLOB SERPL: 1.8 {RATIO} (ref 1.1–2.2)
ALP SERPL-CCNC: 142 U/L (ref 40–129)
ALT SERPL-CCNC: 21 U/L (ref 10–40)
ANION GAP SERPL CALCULATED.3IONS-SCNC: 15 MMOL/L (ref 3–16)
AST SERPL-CCNC: 15 U/L (ref 15–37)
BASOPHILS # BLD: 0.1 K/UL (ref 0–0.2)
BASOPHILS NFR BLD: 1.1 %
BILIRUB SERPL-MCNC: 0.5 MG/DL (ref 0–1)
BUN SERPL-MCNC: 11 MG/DL (ref 7–20)
CALCIUM SERPL-MCNC: 9.6 MG/DL (ref 8.3–10.6)
CHLORIDE SERPL-SCNC: 103 MMOL/L (ref 99–110)
CHOLEST SERPL-MCNC: 219 MG/DL (ref 0–199)
CO2 SERPL-SCNC: 23 MMOL/L (ref 21–32)
CREAT SERPL-MCNC: 0.7 MG/DL (ref 0.9–1.3)
DEPRECATED RDW RBC AUTO: 14.3 % (ref 12.4–15.4)
EOSINOPHIL # BLD: 0.2 K/UL (ref 0–0.6)
EOSINOPHIL NFR BLD: 3.4 %
GFR SERPLBLD CREATININE-BSD FMLA CKD-EPI: >60 ML/MIN/{1.73_M2}
GLUCOSE SERPL-MCNC: 109 MG/DL (ref 70–99)
HCT VFR BLD AUTO: 47.3 % (ref 40.5–52.5)
HDLC SERPL-MCNC: 50 MG/DL (ref 40–60)
HGB BLD-MCNC: 15.7 G/DL (ref 13.5–17.5)
LDLC SERPL CALC-MCNC: 140 MG/DL
LYMPHOCYTES # BLD: 1.9 K/UL (ref 1–5.1)
LYMPHOCYTES NFR BLD: 27.1 %
MCH RBC QN AUTO: 27.3 PG (ref 26–34)
MCHC RBC AUTO-ENTMCNC: 33.2 G/DL (ref 31–36)
MCV RBC AUTO: 82 FL (ref 80–100)
MONOCYTES # BLD: 0.6 K/UL (ref 0–1.3)
MONOCYTES NFR BLD: 8.4 %
NEUTROPHILS # BLD: 4.3 K/UL (ref 1.7–7.7)
NEUTROPHILS NFR BLD: 60 %
PLATELET # BLD AUTO: 237 K/UL (ref 135–450)
PMV BLD AUTO: 9.3 FL (ref 5–10.5)
POTASSIUM SERPL-SCNC: 3.8 MMOL/L (ref 3.5–5.1)
PROT SERPL-MCNC: 7.8 G/DL (ref 6.4–8.2)
RBC # BLD AUTO: 5.77 M/UL (ref 4.2–5.9)
SODIUM SERPL-SCNC: 141 MMOL/L (ref 136–145)
TRIGL SERPL-MCNC: 146 MG/DL (ref 0–150)
TSH SERPL DL<=0.005 MIU/L-ACNC: 1.55 UIU/ML (ref 0.27–4.2)
VLDLC SERPL CALC-MCNC: 29 MG/DL
WBC # BLD AUTO: 7.2 K/UL (ref 4–11)

## 2023-05-30 SDOH — HEALTH STABILITY: PHYSICAL HEALTH: ON AVERAGE, HOW MANY DAYS PER WEEK DO YOU ENGAGE IN MODERATE TO STRENUOUS EXERCISE (LIKE A BRISK WALK)?: 0 DAYS

## 2023-05-30 ASSESSMENT — PATIENT HEALTH QUESTIONNAIRE - PHQ9
1. LITTLE INTEREST OR PLEASURE IN DOING THINGS: 3
5. POOR APPETITE OR OVEREATING: 3
3. TROUBLE FALLING OR STAYING ASLEEP: 0
8. MOVING OR SPEAKING SO SLOWLY THAT OTHER PEOPLE COULD HAVE NOTICED. OR THE OPPOSITE, BEING SO FIGETY OR RESTLESS THAT YOU HAVE BEEN MOVING AROUND A LOT MORE THAN USUAL: 3
4. FEELING TIRED OR HAVING LITTLE ENERGY: 1
SUM OF ALL RESPONSES TO PHQ QUESTIONS 1-9: 15
10. IF YOU CHECKED OFF ANY PROBLEMS, HOW DIFFICULT HAVE THESE PROBLEMS MADE IT FOR YOU TO DO YOUR WORK, TAKE CARE OF THINGS AT HOME, OR GET ALONG WITH OTHER PEOPLE: 3
7. TROUBLE CONCENTRATING ON THINGS, SUCH AS READING THE NEWSPAPER OR WATCHING TELEVISION: 1
9. THOUGHTS THAT YOU WOULD BE BETTER OFF DEAD, OR OF HURTING YOURSELF: 0
SUM OF ALL RESPONSES TO PHQ QUESTIONS 1-9: 15
SUM OF ALL RESPONSES TO PHQ QUESTIONS 1-9: 15
SUM OF ALL RESPONSES TO PHQ9 QUESTIONS 1 & 2: 6
2. FEELING DOWN, DEPRESSED OR HOPELESS: 3
SUM OF ALL RESPONSES TO PHQ QUESTIONS 1-9: 15
6. FEELING BAD ABOUT YOURSELF - OR THAT YOU ARE A FAILURE OR HAVE LET YOURSELF OR YOUR FAMILY DOWN: 1

## 2023-05-30 ASSESSMENT — LIFESTYLE VARIABLES
HOW OFTEN DO YOU HAVE A DRINK CONTAINING ALCOHOL: 1
HOW MANY STANDARD DRINKS CONTAINING ALCOHOL DO YOU HAVE ON A TYPICAL DAY: 0
HOW MANY STANDARD DRINKS CONTAINING ALCOHOL DO YOU HAVE ON A TYPICAL DAY: PATIENT DOES NOT DRINK
HOW OFTEN DO YOU HAVE A DRINK CONTAINING ALCOHOL: NEVER
HOW OFTEN DO YOU HAVE SIX OR MORE DRINKS ON ONE OCCASION: 1

## 2023-05-31 ENCOUNTER — OFFICE VISIT (OUTPATIENT)
Dept: FAMILY MEDICINE CLINIC | Age: 28
End: 2023-05-31

## 2023-05-31 VITALS
BODY MASS INDEX: 41.57 KG/M2 | OXYGEN SATURATION: 96 % | SYSTOLIC BLOOD PRESSURE: 139 MMHG | WEIGHT: 234.6 LBS | TEMPERATURE: 97.3 F | HEART RATE: 111 BPM | DIASTOLIC BLOOD PRESSURE: 85 MMHG | HEIGHT: 63 IN | RESPIRATION RATE: 16 BRPM

## 2023-05-31 DIAGNOSIS — Z00.00 INITIAL MEDICARE ANNUAL WELLNESS VISIT: Primary | ICD-10-CM

## 2023-05-31 DIAGNOSIS — I10 PRIMARY HYPERTENSION: ICD-10-CM

## 2023-05-31 NOTE — PROGRESS NOTES
Medicare Annual Wellness Visit    Rashard Perez is here for Medicare AWV (awv)    Assessment & Plan   Initial Medicare annual wellness visit  Recommendations for Preventive Services Due: see orders and patient instructions/AVS.  Recommended screening schedule for the next 5-10 years is provided to the patient in written form: see Patient Instructions/AVS.     No follow-ups on file. Subjective   The following acute and/or chronic problems were also addressed today:  HTN  - taking Amlodipine 10 mg  - today's /85, denies dizziness or HA's. Labs from May 23, 2023 reviewed at today's visit, within normal limits except for glucose 109, total cholesterol 219, , alk phos 142. Patient's complete Health Risk Assessment and screening values have been reviewed and are found in Flowsheets. The following problems were reviewed today and where indicated follow up appointments were made and/or referrals ordered. Positive Risk Factor Screenings with Interventions:        Depression:  PHQ-2 Score: 6  PHQ-9 Total Score: 15    Interpretation:   1-4 = minimal  5-9 = mild  10-14 = moderate  15-19 = moderately severe  20-27 = severe  Interventions:  Sees a therapist, pt states he has been having a lot of anxiety, sees psych every 3 months, denies SI          General HRA Questions:  Select all that apply: (!) Loneliness, Social Isolation, Stress    Loneliness Interventions:  Pt's mother present and states that he has social anxiety but a  who takes him out once a week.     Social Isolation Interventions:  Continue working with     Stress Interventions:  Continue seeing therapist and psychiatrist       Mati Gutiérrez and Activity:  Physical Activity: Unknown    Days of Exercise per Week: 0 days    Minutes of Exercise per Session: Not on file     On average, how many days per week do you engage in moderate to strenuous exercise (like a brisk walk)?: 0 days  Have you lost any weight without trying

## 2023-06-20 ENCOUNTER — OFFICE VISIT (OUTPATIENT)
Dept: FAMILY MEDICINE CLINIC | Age: 28
End: 2023-06-20

## 2023-06-20 VITALS
SYSTOLIC BLOOD PRESSURE: 117 MMHG | BODY MASS INDEX: 40.15 KG/M2 | TEMPERATURE: 97.5 F | DIASTOLIC BLOOD PRESSURE: 81 MMHG | WEIGHT: 227.4 LBS | OXYGEN SATURATION: 96 % | HEART RATE: 97 BPM | RESPIRATION RATE: 16 BRPM

## 2023-06-20 DIAGNOSIS — R11.2 NAUSEA AND VOMITING, UNSPECIFIED VOMITING TYPE: ICD-10-CM

## 2023-06-20 DIAGNOSIS — I10 PRIMARY HYPERTENSION: ICD-10-CM

## 2023-06-20 DIAGNOSIS — K29.00 ACUTE GASTRITIS WITHOUT HEMORRHAGE, UNSPECIFIED GASTRITIS TYPE: Primary | ICD-10-CM

## 2023-06-20 RX ORDER — ONDANSETRON 4 MG/1
4 TABLET, FILM COATED ORAL 3 TIMES DAILY PRN
Qty: 15 TABLET | Refills: 0 | Status: SHIPPED | OUTPATIENT
Start: 2023-06-20

## 2023-06-20 ASSESSMENT — ENCOUNTER SYMPTOMS
COUGH: 0
NAUSEA: 1
SHORTNESS OF BREATH: 0
RHINORRHEA: 0
VOMITING: 1

## 2023-06-20 NOTE — PROGRESS NOTES
6/20/2023    This is a 32 y.o. male   Chief Complaint   Patient presents with    Fever     X 8 days, off/on, 100.5     Nausea    Emesis     X 7 days    . HPI  Presents today for 8-day history of off-and-on fever 100.5, nausea and vomiting for 7 days, and sneezing periodically. Mother present today. Mother and sister were sick at home. Kept food down yesterday, temp today 97.5 F, sx overall improving, initially had purulent nasal discharge but has resolved.  One week SpO2 dropped to % without SOB, today's SpO2 96 %    HTN: BP today 117/81, taking Amlodipine 10 mg daily    Past Medical History:   Diagnosis Date    Allergic rhinitis     Anxiety     Asthma     Bipolar disorder (HCC)     Chronic back pain     Depression     GERD (gastroesophageal reflux disease)     Hypertension     Seizures (HCC)     Urinary incontinence        Past Surgical History:   Procedure Laterality Date    APPENDECTOMY      BACK SURGERY         Social History     Socioeconomic History    Marital status: Single     Spouse name: Not on file    Number of children: Not on file    Years of education: Not on file    Highest education level: Not on file   Occupational History    Not on file   Tobacco Use    Smoking status: Never    Smokeless tobacco: Former   Vaping Use    Vaping Use: Never used   Substance and Sexual Activity    Alcohol use: Never    Drug use: Never    Sexual activity: Not Currently   Other Topics Concern    Not on file   Social History Narrative    Not on file     Social Determinants of Health     Financial Resource Strain: Not on file   Food Insecurity: Not on file   Transportation Needs: Not on file   Physical Activity: Unknown    Days of Exercise per Week: 0 days    Minutes of Exercise per Session: Not on file   Stress: Not on file   Social Connections: Not on file   Intimate Partner Violence: Not on file   Housing Stability: Not on file       Family History   Problem Relation Age of Onset    Cancer Mother     Diabetes

## 2023-06-26 ENCOUNTER — TELEPHONE (OUTPATIENT)
Dept: FAMILY MEDICINE CLINIC | Age: 28
End: 2023-06-26

## 2023-06-26 DIAGNOSIS — K29.00 ACUTE GASTRITIS WITHOUT HEMORRHAGE, UNSPECIFIED GASTRITIS TYPE: Primary | ICD-10-CM

## 2023-06-26 RX ORDER — ONDANSETRON 4 MG/1
4 TABLET, ORALLY DISINTEGRATING ORAL 3 TIMES DAILY PRN
Qty: 21 TABLET | Refills: 0 | Status: SHIPPED | OUTPATIENT
Start: 2023-06-26

## 2023-09-21 RX ORDER — AMLODIPINE BESYLATE 10 MG/1
TABLET ORAL
Qty: 90 TABLET | Refills: 1 | Status: SHIPPED | OUTPATIENT
Start: 2023-09-21

## 2023-09-22 RX ORDER — AMLODIPINE BESYLATE 10 MG/1
10 TABLET ORAL DAILY
Qty: 90 TABLET | Refills: 1 | OUTPATIENT
Start: 2023-09-22

## 2023-09-22 RX ORDER — AMLODIPINE BESYLATE 10 MG/1
TABLET ORAL
Qty: 90 TABLET | Refills: 1 | OUTPATIENT
Start: 2023-09-22

## 2023-11-07 RX ORDER — PANTOPRAZOLE SODIUM 40 MG/1
TABLET, DELAYED RELEASE ORAL
Qty: 90 TABLET | Refills: 1 | Status: SHIPPED | OUTPATIENT
Start: 2023-11-07

## 2023-11-28 ENCOUNTER — TELEMEDICINE (OUTPATIENT)
Dept: FAMILY MEDICINE CLINIC | Age: 28
End: 2023-11-28
Payer: MEDICARE

## 2023-11-28 DIAGNOSIS — U07.1 COVID-19: Primary | ICD-10-CM

## 2023-11-28 PROCEDURE — G8428 CUR MEDS NOT DOCUMENT: HCPCS | Performed by: FAMILY MEDICINE

## 2023-11-28 PROCEDURE — 99213 OFFICE O/P EST LOW 20 MIN: CPT | Performed by: FAMILY MEDICINE

## 2023-11-28 RX ORDER — GUAIFENESIN 600 MG/1
600 TABLET, EXTENDED RELEASE ORAL 2 TIMES DAILY PRN
Qty: 14 TABLET | Refills: 1 | Status: SHIPPED | OUTPATIENT
Start: 2023-11-28 | End: 2023-12-05

## 2023-11-28 RX ORDER — ECHINACEA PURPUREA EXTRACT 125 MG
1 TABLET ORAL PRN
Qty: 1 EACH | Refills: 0 | Status: SHIPPED | OUTPATIENT
Start: 2023-11-28

## 2023-11-28 ASSESSMENT — ANXIETY QUESTIONNAIRES
3. WORRYING TOO MUCH ABOUT DIFFERENT THINGS: 3
5. BEING SO RESTLESS THAT IT IS HARD TO SIT STILL: 3
3. WORRYING TOO MUCH ABOUT DIFFERENT THINGS: NEARLY EVERY DAY
IF YOU CHECKED OFF ANY PROBLEMS ON THIS QUESTIONNAIRE, HOW DIFFICULT HAVE THESE PROBLEMS MADE IT FOR YOU TO DO YOUR WORK, TAKE CARE OF THINGS AT HOME, OR GET ALONG WITH OTHER PEOPLE: EXTREMELY DIFFICULT
IF YOU CHECKED OFF ANY PROBLEMS ON THIS QUESTIONNAIRE, HOW DIFFICULT HAVE THESE PROBLEMS MADE IT FOR YOU TO DO YOUR WORK, TAKE CARE OF THINGS AT HOME, OR GET ALONG WITH OTHER PEOPLE: EXTREMELY DIFFICULT
1. FEELING NERVOUS, ANXIOUS, OR ON EDGE: 3
4. TROUBLE RELAXING: 3
6. BECOMING EASILY ANNOYED OR IRRITABLE: NEARLY EVERY DAY
7. FEELING AFRAID AS IF SOMETHING AWFUL MIGHT HAPPEN: NEARLY EVERY DAY
5. BEING SO RESTLESS THAT IT IS HARD TO SIT STILL: NEARLY EVERY DAY
6. BECOMING EASILY ANNOYED OR IRRITABLE: 3
1. FEELING NERVOUS, ANXIOUS, OR ON EDGE: NEARLY EVERY DAY
4. TROUBLE RELAXING: NEARLY EVERY DAY
7. FEELING AFRAID AS IF SOMETHING AWFUL MIGHT HAPPEN: 3

## 2023-11-28 ASSESSMENT — PATIENT HEALTH QUESTIONNAIRE - PHQ9
3. TROUBLE FALLING OR STAYING ASLEEP: 0
6. FEELING BAD ABOUT YOURSELF - OR THAT YOU ARE A FAILURE OR HAVE LET YOURSELF OR YOUR FAMILY DOWN: NEARLY EVERY DAY
1. LITTLE INTEREST OR PLEASURE IN DOING THINGS: NEARLY EVERY DAY
4. FEELING TIRED OR HAVING LITTLE ENERGY: SEVERAL DAYS
SUM OF ALL RESPONSES TO PHQ9 QUESTIONS 1 & 2: 6
3. TROUBLE FALLING OR STAYING ASLEEP: NOT AT ALL
SUM OF ALL RESPONSES TO PHQ QUESTIONS 1-9: 14
2. FEELING DOWN, DEPRESSED OR HOPELESS: 3
2. FEELING DOWN, DEPRESSED OR HOPELESS: NEARLY EVERY DAY
6. FEELING BAD ABOUT YOURSELF - OR THAT YOU ARE A FAILURE OR HAVE LET YOURSELF OR YOUR FAMILY DOWN: 3
7. TROUBLE CONCENTRATING ON THINGS, SUCH AS READING THE NEWSPAPER OR WATCHING TELEVISION: SEVERAL DAYS
7. TROUBLE CONCENTRATING ON THINGS, SUCH AS READING THE NEWSPAPER OR WATCHING TELEVISION: 1
8. MOVING OR SPEAKING SO SLOWLY THAT OTHER PEOPLE COULD HAVE NOTICED. OR THE OPPOSITE, BEING SO FIGETY OR RESTLESS THAT YOU HAVE BEEN MOVING AROUND A LOT MORE THAN USUAL: 0
SUM OF ALL RESPONSES TO PHQ QUESTIONS 1-9: 14
5. POOR APPETITE OR OVEREATING: NEARLY EVERY DAY
5. POOR APPETITE OR OVEREATING: 3
8. MOVING OR SPEAKING SO SLOWLY THAT OTHER PEOPLE COULD HAVE NOTICED. OR THE OPPOSITE - BEING SO FIDGETY OR RESTLESS THAT YOU HAVE BEEN MOVING AROUND A LOT MORE THAN USUAL: NOT AT ALL
10. IF YOU CHECKED OFF ANY PROBLEMS, HOW DIFFICULT HAVE THESE PROBLEMS MADE IT FOR YOU TO DO YOUR WORK, TAKE CARE OF THINGS AT HOME, OR GET ALONG WITH OTHER PEOPLE: 2
10. IF YOU CHECKED OFF ANY PROBLEMS, HOW DIFFICULT HAVE THESE PROBLEMS MADE IT FOR YOU TO DO YOUR WORK, TAKE CARE OF THINGS AT HOME, OR GET ALONG WITH OTHER PEOPLE: VERY DIFFICULT
SUM OF ALL RESPONSES TO PHQ9 QUESTIONS 1 & 2: 6
9. THOUGHTS THAT YOU WOULD BE BETTER OFF DEAD, OR OF HURTING YOURSELF: 0
SUM OF ALL RESPONSES TO PHQ QUESTIONS 1-9: 14
9. THOUGHTS THAT YOU WOULD BE BETTER OFF DEAD, OR OF HURTING YOURSELF: NOT AT ALL
4. FEELING TIRED OR HAVING LITTLE ENERGY: 1
1. LITTLE INTEREST OR PLEASURE IN DOING THINGS: 3

## 2023-11-28 NOTE — PROGRESS NOTES
[] Abnormal-     Neck: [x] No visualized mass     Pulmonary/Chest: [x] Respiratory effort normal.  [x] No visualized signs of difficulty breathing or respiratory distress        [] Abnormal-    Able to speak in full sentences without difficulty  Musculoskeletal:   [] Normal gait with no signs of ataxia         [x] Normal range of motion of neck        [] Abnormal-       Neurological:        [x] No Facial Asymmetry (Cranial nerve 7 motor function) (limited exam to video visit)          [x] No gaze palsy        [] Abnormal-         Skin:        [x] No significant exanthematous lesions or discoloration noted on facial skin         [] Abnormal-            Psychiatric:       [x] Normal Affect [] No Hallucinations        [] Abnormal-   Judgment, behavior, thought and mood are normal.            Time spent today for this patient visit may include the following:  preparing to see the patient  Including review of tests, labs and imaging,   revewing previous history and recent encounters,   obtaining and/or reviewing separately obtained history in care everywhere or record,   performing a medically appropriate examination and/or evaluation;   counseling and educating the patient   ordering medications, tests, or procedures;   referring to other health care specialists if applicable;   documenting clinical information in the electronic health record;   independently interpreting results (not separately reported)   and communicating results to the patient. [place], was evaluated through a synchronous (real-time) audio-video encounter. The patient (or guardian if applicable) is aware that this is a billable service, which includes applicable co-pays. This Virtual Visit was conducted with patient's (and/or legal guardian's) consent. Patient identification was verified, and a caregiver was present when appropriate.    The patient was located at Home: [address] [place] [address] [address]  Provider was located at [facility]

## 2024-01-02 DIAGNOSIS — R11.2 NAUSEA AND VOMITING: ICD-10-CM

## 2024-01-02 RX ORDER — ONDANSETRON 4 MG/1
4 TABLET, ORALLY DISINTEGRATING ORAL EVERY 8 HOURS PRN
Qty: 15 TABLET | Refills: 2 | Status: SHIPPED | OUTPATIENT
Start: 2024-01-02

## 2024-04-02 ENCOUNTER — PROCEDURE VISIT (OUTPATIENT)
Dept: AUDIOLOGY | Age: 29
End: 2024-04-02
Payer: MEDICARE

## 2024-04-02 ENCOUNTER — OFFICE VISIT (OUTPATIENT)
Dept: ENT CLINIC | Age: 29
End: 2024-04-02
Payer: MEDICARE

## 2024-04-02 VITALS — BODY MASS INDEX: 49.08 KG/M2 | WEIGHT: 277 LBS | HEIGHT: 63 IN

## 2024-04-02 DIAGNOSIS — H61.23 BILATERAL IMPACTED CERUMEN: Primary | ICD-10-CM

## 2024-04-02 DIAGNOSIS — H90.3 SENSORINEURAL HEARING LOSS, BILATERAL: Primary | ICD-10-CM

## 2024-04-02 DIAGNOSIS — H90.3 SENSORINEURAL HEARING LOSS (SNHL) OF BOTH EARS: ICD-10-CM

## 2024-04-02 PROCEDURE — 69210 REMOVE IMPACTED EAR WAX UNI: CPT | Performed by: STUDENT IN AN ORGANIZED HEALTH CARE EDUCATION/TRAINING PROGRAM

## 2024-04-02 PROCEDURE — 99204 OFFICE O/P NEW MOD 45 MIN: CPT | Performed by: STUDENT IN AN ORGANIZED HEALTH CARE EDUCATION/TRAINING PROGRAM

## 2024-04-02 PROCEDURE — 1036F TOBACCO NON-USER: CPT | Performed by: STUDENT IN AN ORGANIZED HEALTH CARE EDUCATION/TRAINING PROGRAM

## 2024-04-02 PROCEDURE — G8417 CALC BMI ABV UP PARAM F/U: HCPCS | Performed by: STUDENT IN AN ORGANIZED HEALTH CARE EDUCATION/TRAINING PROGRAM

## 2024-04-02 PROCEDURE — 92557 COMPREHENSIVE HEARING TEST: CPT | Performed by: AUDIOLOGIST

## 2024-04-02 PROCEDURE — 92567 TYMPANOMETRY: CPT | Performed by: AUDIOLOGIST

## 2024-04-02 PROCEDURE — G8427 DOCREV CUR MEDS BY ELIG CLIN: HCPCS | Performed by: STUDENT IN AN ORGANIZED HEALTH CARE EDUCATION/TRAINING PROGRAM

## 2024-04-02 ASSESSMENT — ENCOUNTER SYMPTOMS
VOMITING: 0
RHINORRHEA: 0
SHORTNESS OF BREATH: 0
EYE PAIN: 0
NAUSEA: 0

## 2024-04-02 NOTE — PROGRESS NOTES
Luis Carlos Loomis   1995, 28 y.o. male   [id number]       Referring Provider: Brandan Myrick DO   Referral Type: In an order in Epic    Reason for Visit: Evaluation of the cause of disorders of hearing, tinnitus, or balance.    ADULT AUDIOLOGIC EVALUATION      Luis Carlos Loomis is a 28 y.o. male seen today, 4/2/2024 , for an initial audiologic evaluation.  Patient was seen by Brandan Myrick DO  following today's evaluation.    AUDIOLOGIC AND OTHER PERTINENT MEDICAL HISTORY:      Luis Carlos Loomis's caregiver reports possible decline in hearing sensitivity. He lives in a long-term care facility. He was not able to provide significant background.     He denied otalgia, otorrhea, and dizziness.    Date: 4/2/2024     IMPRESSIONS:      Today's results revealed abnormal hearing sensitivity, bilaterally, that is significant enough to cause difficulty in difficult listening situations.Speech testing was not completed as patient struggled with instruction. Tympanometry indicates normal middle ear function. Discussed test results and implications with patient.  Follow medical recommendations of Brandan Myrick DO.    ASSESSMENT AND FINDINGS:     Otoscopy unremarkable.    RIGHT EAR:  Hearing Sensitivity: A mild sloping to moderately severe sensorineural hearing loss.  Tympanometry: Normal peak pressure and compliance, Type A tympanogram, consistent with normal middle ear function. Volume 2.3 cm3, Peak -8 daPa, 1.12 mmho.      LEFT EAR:  Hearing Sensitivity: A mild sloping to severe sensorineural hearing loss.     Tympanometry: Normal peak pressure with high compliance, Type Ad tympanogram, consistent with hypermobile tympanic membrane. Volume 2.0 cm3, Peak -8 daPa, 1.60 mmho.      Reliability: Good   Transducer: Inserts    See scanned audiogram dated 4/2/2024 for results.        PATIENT EDUCATION:       The following items were discussed with the patient:   - Good Communication Strategies  - Hearing Loss and Hearing

## 2024-12-11 ASSESSMENT — ENCOUNTER SYMPTOMS
EYE PAIN: 0
SHORTNESS OF BREATH: 0
RHINORRHEA: 0
COUGH: 0
VOMITING: 0
NAUSEA: 0

## 2024-12-11 NOTE — PROGRESS NOTES
[facility]  DIVISION OF OTOLARYNGOLOGY- HEAD & NECK SURGERY  CONSULT    Patient Name: Luis Carlos Loomis  Medical Record Number:  [id number]  Primary Care Physician:  Thang Ugalde DO  Date of Consultation: 12/12/2024    Chief Complaint:   Chief Complaint   Patient presents with    Follow-up    Hearing Problem     Started having issues about a month ago in both ears on and off    Cerumen Impaction      HISTORY OF PRESENT ILLNESS  Luis Carlos is a(n) 29 y.o. male who presents for evaluation of hearing loss.  He states that the right ear is worse than the left.  He believes that there is a wax impaction.    Interval History 12/12/24  Luis Carlos is having decreased hearing.  He does have wax issues.    Patient Active Problem List   Diagnosis    Lower extremity edema     Past Surgical History:   Procedure Laterality Date    APPENDECTOMY      BACK SURGERY       Family History   Problem Relation Age of Onset    Cancer Mother     Diabetes Mother     High Blood Pressure Mother     Heart Disease Mother      Social History     Socioeconomic History    Marital status: Single     Spouse name: Not on file    Number of children: Not on file    Years of education: Not on file    Highest education level: Not on file   Occupational History    Not on file   Tobacco Use    Smoking status: Never    Smokeless tobacco: Former   Vaping Use    Vaping status: Never Used   Substance and Sexual Activity    Alcohol use: Never    Drug use: Never    Sexual activity: Not Currently   Other Topics Concern    Not on file   Social History Narrative    Not on file     Social Determinants of Health     Financial Resource Strain: Low Risk  (11/17/2021)    Overall Financial Resource Strain (CARDIA)     Difficulty of Paying Living Expenses: Not hard at all   Food Insecurity: Not on file (2/2/2023)   Transportation Needs: No Transportation Needs (11/17/2021)    PRAPARE - Transportation     Lack of Transportation (Medical): No     Lack of Transportation

## 2024-12-12 ENCOUNTER — OFFICE VISIT (OUTPATIENT)
Dept: ENT CLINIC | Age: 29
End: 2024-12-12
Payer: MEDICARE

## 2024-12-12 VITALS
SYSTOLIC BLOOD PRESSURE: 126 MMHG | HEIGHT: 63 IN | DIASTOLIC BLOOD PRESSURE: 84 MMHG | HEART RATE: 80 BPM | BODY MASS INDEX: 49.08 KG/M2 | WEIGHT: 277 LBS

## 2024-12-12 DIAGNOSIS — H61.23 BILATERAL IMPACTED CERUMEN: Primary | ICD-10-CM

## 2024-12-12 DIAGNOSIS — H90.3 SENSORINEURAL HEARING LOSS (SNHL) OF BOTH EARS: ICD-10-CM

## 2024-12-12 PROCEDURE — 1036F TOBACCO NON-USER: CPT | Performed by: STUDENT IN AN ORGANIZED HEALTH CARE EDUCATION/TRAINING PROGRAM

## 2024-12-12 PROCEDURE — 69210 REMOVE IMPACTED EAR WAX UNI: CPT | Performed by: STUDENT IN AN ORGANIZED HEALTH CARE EDUCATION/TRAINING PROGRAM

## 2024-12-12 PROCEDURE — G8417 CALC BMI ABV UP PARAM F/U: HCPCS | Performed by: STUDENT IN AN ORGANIZED HEALTH CARE EDUCATION/TRAINING PROGRAM

## 2024-12-12 PROCEDURE — G8484 FLU IMMUNIZE NO ADMIN: HCPCS | Performed by: STUDENT IN AN ORGANIZED HEALTH CARE EDUCATION/TRAINING PROGRAM

## 2024-12-12 PROCEDURE — G8427 DOCREV CUR MEDS BY ELIG CLIN: HCPCS | Performed by: STUDENT IN AN ORGANIZED HEALTH CARE EDUCATION/TRAINING PROGRAM

## 2024-12-12 PROCEDURE — 99213 OFFICE O/P EST LOW 20 MIN: CPT | Performed by: STUDENT IN AN ORGANIZED HEALTH CARE EDUCATION/TRAINING PROGRAM

## 2024-12-12 RX ORDER — ARIPIPRAZOLE 10 MG/1
TABLET ORAL
COMMUNITY
Start: 2024-12-05

## 2025-05-31 NOTE — PROGRESS NOTES
[facility]  DIVISION OF OTOLARYNGOLOGY- HEAD & NECK SURGERY  CONSULT    Patient Name: Luis Carlos Loomis  Medical Record Number:  [id number]  Primary Care Physician:  Thang Ugalde DO  Date of Consultation: 4/2/2024    Chief Complaint:   Chief Complaint   Patient presents with    New Patient    Cerumen Impaction      HISTORY OF PRESENT ILLNESS  Luis Carlos is a(n) 28 y.o. male who presents for evaluation of hearing loss.  He states that the right ear is worse than the left.  He believes that there is a wax impaction.  Patient Active Problem List   Diagnosis    Lower extremity edema     Past Surgical History:   Procedure Laterality Date    APPENDECTOMY      BACK SURGERY       Family History   Problem Relation Age of Onset    Cancer Mother     Diabetes Mother     High Blood Pressure Mother     Heart Disease Mother      Social History     Socioeconomic History    Marital status: Single     Spouse name: Not on file    Number of children: Not on file    Years of education: Not on file    Highest education level: Not on file   Occupational History    Not on file   Tobacco Use    Smoking status: Never    Smokeless tobacco: Former   Vaping Use    Vaping Use: Never used   Substance and Sexual Activity    Alcohol use: Never    Drug use: Never    Sexual activity: Not Currently   Other Topics Concern    Not on file   Social History Narrative    Not on file     Social Determinants of Health     Financial Resource Strain: Low Risk  (11/17/2021)    Overall Financial Resource Strain (CARDIA)     Difficulty of Paying Living Expenses: Not hard at all   Food Insecurity: Not on file (2/2/2023)   Transportation Needs: No Transportation Needs (11/17/2021)    PRAPARE - Transportation     Lack of Transportation (Medical): No     Lack of Transportation (Non-Medical): No   Physical Activity: Unknown (5/30/2023)    Exercise Vital Sign     Days of Exercise per Week: 0 days     Minutes of Exercise per Session: Not on file   Stress: Not on file  DISPLAY PLAN FREE TEXT

## 2025-08-11 ENCOUNTER — HOSPITAL ENCOUNTER (OUTPATIENT)
Age: 30
Discharge: HOME | End: 2025-08-11
Payer: MEDICARE

## 2025-08-11 LAB
25(OH)D3 SERPL-MCNC: 109 NG/ML
ALBUMIN SERPL-MCNC: 5 G/DL (ref 3.4–5)
ALBUMIN/GLOB SERPL: 1.6 {RATIO} (ref 1.1–2.2)
ALP SERPL-CCNC: 90 U/L (ref 40–129)
ALT SERPL-CCNC: 27 U/L (ref 10–40)
ANION GAP SERPL CALCULATED.3IONS-SCNC: 12 MMOL/L (ref 3–16)
AST SERPL-CCNC: 17 U/L (ref 15–37)
BASOPHILS # BLD: 0.1 K/UL (ref 0–0.2)
BASOPHILS NFR BLD: 0.9 %
BILIRUB SERPL-MCNC: 0.7 MG/DL (ref 0–1)
BUN SERPL-MCNC: 8 MG/DL (ref 7–20)
CALCIUM SERPL-MCNC: 10.2 MG/DL (ref 8.3–10.6)
CHLORIDE SERPL-SCNC: 104 MMOL/L (ref 99–110)
CHOLEST SERPL-MCNC: 174 MG/DL (ref 0–199)
CO2 SERPL-SCNC: 27 MMOL/L (ref 21–32)
CREAT SERPL-MCNC: 0.7 MG/DL (ref 0.9–1.3)
DEPRECATED RDW RBC AUTO: 14.1 % (ref 12.4–15.4)
EOSINOPHIL # BLD: 0.1 K/UL (ref 0–0.6)
EOSINOPHIL NFR BLD: 1.1 %
EST. AVERAGE GLUCOSE BLD GHB EST-MCNC: 114 MG/DL
FOLATE SERPL-MCNC: 11 NG/ML (ref 4.78–24.2)
GFR SERPLBLD CREATININE-BSD FMLA CKD-EPI: >90 ML/MIN/{1.73_M2}
GLUCOSE P FAST SERPL-MCNC: 100 MG/DL (ref 70–99)
HBA1C MFR BLD: 5.6 %
HCT VFR BLD AUTO: 47.7 % (ref 40.5–52.5)
HDLC SERPL-MCNC: 48 MG/DL (ref 40–60)
HGB BLD-MCNC: 16.3 G/DL (ref 13.5–17.5)
LDL CHOLESTEROL: 115 MG/DL
LYMPHOCYTES # BLD: 1.5 K/UL (ref 1–5.1)
LYMPHOCYTES NFR BLD: 22.2 %
MCH RBC QN AUTO: 28.2 PG (ref 26–34)
MCHC RBC AUTO-ENTMCNC: 34.1 G/DL (ref 31–36)
MCV RBC AUTO: 82.5 FL (ref 80–100)
MONOCYTES # BLD: 0.6 K/UL (ref 0–1.3)
MONOCYTES NFR BLD: 8.8 %
NEUTROPHILS # BLD: 4.6 K/UL (ref 1.7–7.7)
NEUTROPHILS NFR BLD: 67 %
PLATELET # BLD AUTO: 228 K/UL (ref 135–450)
PMV BLD AUTO: 9.1 FL (ref 5–10.5)
POTASSIUM SERPL-SCNC: 3.8 MMOL/L (ref 3.5–5.1)
PROT SERPL-MCNC: 8.2 G/DL (ref 6.4–8.2)
RBC # BLD AUTO: 5.79 M/UL (ref 4.2–5.9)
SODIUM SERPL-SCNC: 143 MMOL/L (ref 136–145)
TRIGL SERPL-MCNC: 53 MG/DL (ref 0–150)
TSH SERPL DL<=0.005 MIU/L-ACNC: 0.63 UIU/ML (ref 0.27–4.2)
VIT B12 SERPL-MCNC: 407 PG/ML (ref 211–911)
VLDLC SERPL CALC-MCNC: 11 MG/DL
WBC # BLD AUTO: 6.8 K/UL (ref 4–11)

## 2025-08-11 PROCEDURE — 82306 VITAMIN D 25 HYDROXY: CPT

## 2025-08-11 PROCEDURE — 80053 COMPREHEN METABOLIC PANEL: CPT

## 2025-08-11 PROCEDURE — 83036 HEMOGLOBIN GLYCOSYLATED A1C: CPT

## 2025-08-11 PROCEDURE — 82607 VITAMIN B-12: CPT

## 2025-08-11 PROCEDURE — 80061 LIPID PANEL: CPT

## 2025-08-11 PROCEDURE — 82746 ASSAY OF FOLIC ACID SERUM: CPT

## 2025-08-11 PROCEDURE — 85025 COMPLETE CBC W/AUTO DIFF WBC: CPT

## 2025-08-11 PROCEDURE — 84443 ASSAY THYROID STIM HORMONE: CPT
